# Patient Record
Sex: MALE | Race: WHITE | Employment: FULL TIME | URBAN - NONMETROPOLITAN AREA
[De-identification: names, ages, dates, MRNs, and addresses within clinical notes are randomized per-mention and may not be internally consistent; named-entity substitution may affect disease eponyms.]

---

## 2017-10-06 ENCOUNTER — TELEPHONE (OUTPATIENT)
Dept: PULMONOLOGY | Age: 32
End: 2017-10-06

## 2017-10-06 ENCOUNTER — INITIAL CONSULT (OUTPATIENT)
Dept: PULMONOLOGY | Age: 32
End: 2017-10-06
Payer: COMMERCIAL

## 2017-10-06 VITALS
OXYGEN SATURATION: 98 % | SYSTOLIC BLOOD PRESSURE: 129 MMHG | HEIGHT: 71 IN | DIASTOLIC BLOOD PRESSURE: 86 MMHG | WEIGHT: 288.2 LBS | HEART RATE: 64 BPM | BODY MASS INDEX: 40.35 KG/M2

## 2017-10-06 DIAGNOSIS — R06.83 SNORING: Primary | ICD-10-CM

## 2017-10-06 DIAGNOSIS — G47.30 SLEEP APNEA, UNSPECIFIED: ICD-10-CM

## 2017-10-06 PROCEDURE — 99203 OFFICE O/P NEW LOW 30 MIN: CPT | Performed by: INTERNAL MEDICINE

## 2017-10-06 NOTE — PROGRESS NOTES
Sleep Medicine Initial Consultation    Monica Roger                                             Chief Complaint: Karlie Macedo is here as a new patient referred by [id number] [facility] with no prior studies    Monica Roger is a 27 y. o.oldmale came for further evaluation regarding his ?sleep apnea  with referral from Ms Brartr. 72. He usually goes to bed at 9:30 to 10:00 PM and wakes up at  6:00 AM. Over the weekends his sleep schedule remain same. He denies taking naps. He usually falls a sleep in <10 minutes. He denies watching Television in his bed room. He denies reading books in his bed room. He denies working with his electronic devices in his bed room before going to sleep. He denies any difficulty in going to sleep. He wakes up 1 to 2 times during night. Majority of nocturnal awakenings are not for urination but to take care of his daughter. He denies any difficulty in falling back to sleep after nocturnal awkenings. He was noticed to have loud snoring without withnessed apneas by his family members including his wife during sleep. He  denies history of choking and gasping sensation at night time. He denies headaches in the morning. He admits to dry mouth in the morning occasionally. He denies palpitations during night time or during nocturnal awakenings. He admits to sweating during nocturnal awakenings. He denies history of head injury in the past. He denies motor vehicle accidents. He denies any history suggestive of hypnagogic or hypnopompic hallucinations. He denies any history of seizures and sleep walking. He denies any history of sleep talking. No history suggestive of bruxism. No history suggestive of restless leg syndrome. He denies any history suggestive of cataplexy or sleep paralysis. No history suggestive of periodic limb movements during sleep    No family history of obstructive sleep apnea. No family history of Narcolepsy.  He never had sleep study in the past.      Social History:  Social History   Substance Use Topics    Smoking status: Former Smoker     Packs/day: 0.50     Types: Cigarettes     Start date: 10/6/2002     Quit date: 10/6/2007    Smokeless tobacco: None    Alcohol use No   .  He is currently working as at [employer] during the daytime. He drinks 1 to 3 cups of coffee per day. He drinks 1 to 2 cans of caffeinated beverages i.e sodas per day I.e Coke or Pepsi. He drinks 1 glass of tea per week. He drinks alcoholic beverages socially. No history of recreational drug use. Past Medical History:   Diagnosis Date    Heart burn        Past Surgical History:   Procedure Laterality Date    ANTERIOR CRUCIATE LIGAMENT REPAIR         No Known Allergies    Current Outpatient Prescriptions   Medication Sig Dispense Refill    pantoprazole (PROTONIX) 20 MG tablet Take 20 mg by mouth daily      Multiple Vitamins-Minerals (THERAPEUTIC MULTIVITAMIN-MINERALS) tablet Take 1 tablet by mouth daily      ondansetron (ZOFRAN ODT) 4 MG disintegrating tablet Take 1 tablet by mouth every 8 hours as needed for Nausea 20 tablet 0     No current facility-administered medications for this visit. No family history on file. Review of Systems   General/Constitutional: No recent loss of weight or appetite changes. No fever or chills. HENT: Negative. Eyes: Negative. Upper respiratory tract: No nasal stuffiness or post nasal drip. Lower respiratory tract/ lungs: No cough or sputum production. No hemoptysis. Cardiovascular: No palpitations or chest pain. Gastrointestinal: No nausea or vomiting. Neurological: No focal neurologiacal weakness. Extremities: No edema. Musculoskeletal: No complaints. Genitourinary: No complaints. Hematological: Negative. Psychiatric/Behavioral: Negative. Skin: No itching.     /86 (Site: Left Arm, Position: Sitting, Cuff Size: Large Adult)  Pulse 64  Ht 5' 11\" (1.803 m)  Wt 288 lb 3.2 oz (130.7 kg)  SpO2 98% Comment: RA at rest  BMI 40.2 kg/m2  Mallampati airway Class: 4  Neck Circumference:16.75 Inches   [place] sleepiness score 10/6/17: 1      Physical Exam   Nursing note and vitals reviewed. Constitutional: Patient appears well built and well nourished. No distress. Patient is oriented to person, place, and time. HENT:   Head: Normocephalic and atraumatic. Right Ear: External ear normal.   Left Ear: External ear normal.   Mouth/Throat: Oropharynx is clear and moist.  No oral thrush. Eyes: Conjunctivae are normal. Pupils are equal, round, and reactive to light. No scleral icterus. Neck: Neck supple. No JVD present. No tracheal deviation present. Cardiovascular: Normal rate, regular rhythm, normal heart sounds. No murmur heard. Pulmonary/Chest: Effort normal and breath sounds normal. No stridor. No respiratory distress. No wheezes. No rales. Patient exhibits no tenderness. Abdominal: Soft. Patient exhibits no distension. No tenderness. Musculoskeletal: Normal range of motion. Extremities: Patient exhibits no edema and no tenderness. Lymphadenopathy:  No cervical adenopathy. Neurological: Patient is alert and oriented to person, place, and time. Skin: Skin is warm and dry. Patient is not diaphoretic. Psychiatric: Patient  has a normal mood and affect. Patient behavior is normal.     Diagnostic Data:  None related sleep. Assessment:  -Snoring without witnessed apneas, nocturnal awakenings- need to evaluate for obstructive sleep apnea. His snoring is troubling his wife's sleep.  -Inadequate sleep hygiene. -GERD on treatment with PPI. He follows with his family physician. Recommendations/Plan:  -Will schedule patient for polysomnogram in the sleep lab to evaluate and to exclude sleep apnea.    -I had a discussion with patient regarding avialable treatment options for his sleep disorder breathing including but not limited to CPAP titration in the sleep lab Vs.Dental appliance placement

## 2017-10-06 NOTE — PATIENT INSTRUCTIONS
Recommendations/Plan:  -Will schedule patient for polysomnogram in the sleep lab to evaluate and to exclude sleep apnea. -I had a discussion with patient regarding avialable treatment options for his sleep disorder breathing including but not limited to CPAP titration in the sleep lab Vs.Dental appliance placement with referral to a local dentist Vs other available surgical options including Uvulopalatopharyngoplasty, maxillomandibular ostomy and tracheostomy as last option. At the end of discussion, he is not decided on his treatment if he found to have obstructive sleep apnea at this time.  -We will see Rogelio Keith back in 1week after the sleep study to go over the sleep study results and further management options.  -He was educated to practice good sleep hygiene practices. He was provided with a good sleep hygiene hand out. Jacqueline Ruiz was advised to make earlier appointment with my clinic if he develops any worsening of sleep symptoms. He verbalizes understanding.  -He was advised to loose weight by controlling diet and doing exercise once cleared by his family physician. - Rogelio Keith was educated about my impression and plan. He verbalizes understanding.

## 2017-10-06 NOTE — MR AVS SNAPSHOT
After Visit Summary             [facility]   10/6/2017 10:30 AM   Initial consult    Description:  Male : 1985   Provider:  Helen Berry MD   Department:  [facility] and Future Appointments         Below is a list of your follow-up and future appointments. This may not be a complete list as you may have made appointments directly with providers that we are not aware of or your providers may have made some for you. Please call your providers to confirm appointments. It is important to keep your appointments. Please bring your current insurance card, photo ID, co-pay, and all medication bottles to your appointment. If self-pay, payment is expected at the time of service. Information from Your Visit        Department     Name Address Phone [facility] Pulmonary Medicine [address]  [facility] [place] [employer] [phone]      Vital Signs     Blood Pressure Pulse Height Weight Oxygen Saturation Body Mass Index    129/86 (Site: Left Arm, Position: Sitting, Cuff Size: Large Adult) 64 5' 11\" (1.803 m) 288 lb 3.2 oz (130.7 kg) 98% 40.2 kg/m2    Smoking Status                   Former Smoker           Additional Information about your Body Mass Index (BMI)           Your BMI as listed above is considered obese (30 or more). BMI is an estimate of body fat, calculated from your height and weight. The higher your BMI, the greater your risk of heart disease, high blood pressure, type 2 diabetes, stroke, gallstones, arthritis, sleep apnea, and certain cancers. BMI is not perfect. It may overestimate body fat in athletes and people who are more muscular. Even a small weight loss (between 5 and 10 percent of your current weight) by decreasing your calorie intake and becoming more physically active will help lower your risk of developing or worsening diseases associated with obesity.

## 2018-03-16 ENCOUNTER — OFFICE VISIT (OUTPATIENT)
Dept: PULMONOLOGY | Age: 33
End: 2018-03-16
Payer: COMMERCIAL

## 2018-03-16 VITALS
HEART RATE: 102 BPM | BODY MASS INDEX: 39.48 KG/M2 | HEIGHT: 71 IN | OXYGEN SATURATION: 97 % | SYSTOLIC BLOOD PRESSURE: 116 MMHG | DIASTOLIC BLOOD PRESSURE: 74 MMHG | WEIGHT: 282 LBS

## 2018-03-16 DIAGNOSIS — G47.30 SLEEP APNEA, UNSPECIFIED TYPE: Primary | ICD-10-CM

## 2018-03-16 DIAGNOSIS — R06.83 SNORING: ICD-10-CM

## 2018-03-16 DIAGNOSIS — K21.9 GASTROESOPHAGEAL REFLUX DISEASE WITHOUT ESOPHAGITIS: ICD-10-CM

## 2018-03-16 PROCEDURE — 99213 OFFICE O/P EST LOW 20 MIN: CPT | Performed by: INTERNAL MEDICINE

## 2018-03-16 RX ORDER — OMEPRAZOLE 10 MG/1
10 CAPSULE, DELAYED RELEASE ORAL DAILY
COMMUNITY

## 2018-03-16 NOTE — PATIENT INSTRUCTIONS
Health Maintenance Due   Topic Date Due    HIV screen  01/13/1998    DTaP/Tdap/Td vaccine (1 - Tdap) 01/13/2002    Flu vaccine (1) 09/01/2017     Recommendations/Plan:  -Will re schedule patient for polysomnogram in the sleep lab to evaluate and to exclude sleep apnea. .  -We will see Ana Rosa Huntley back in 1week after the sleep study to go over the sleep study results and further management options.  -He was educated to practice good sleep hygiene practices. He was provided with a good sleep hygiene hand out. Genevieve Gray was advised to make earlier appointment with my clinic if he develops any worsening of sleep symptoms. He verbalizes understanding.  -He was advised to loose weight by controlling diet and doing exercise once cleared by his family physician. - Ana Rosa Huntley was educated about my impression and plan. He verbalizes understanding.

## 2018-03-16 NOTE — PROGRESS NOTES
Sleep Medicine clinic follow up note                                         [facility]                                             Chief Complaint: Ino Rousseau is here for follow up and discuss regarding having a sleep test.    He was initially seen by me on 10/6/17. He was requested to have polysomnogram in the sleep lab to evaluate and to exclude sleep apnea. How ever he never had sleep test so far. He called our office in October, 2017 and informed that he is not going to have the sleep test done in 2017  due to its cost and high deductible. He came for follow up to reschedule his sleep test.     His wife is still complaining about his loud snoring during sleep. He was initially referred  from Ms Mauricio Wong. He usually goes to bed at 9:30 to 10:00 PM and wakes up at  6:00 AM. Over the weekends his sleep schedule remain same. He denies taking naps. He usually falls a sleep in <10 minutes. He denies watching Television in his bed room. He denies reading books in his bed room. He denies working with his electronic devices in his bed room before going to sleep. He denies any difficulty in going to sleep. He wakes up 0 to 1 times during night to take care of his 11months old daughter. He denies any difficulty in falling back to sleep after nocturnal awkenings. He was noticed to have loud snoring without withnessed apneas by his family members including his wife during sleep. He  denies history of choking and gasping sensation at night time. He denies headaches in the morning. He admits to dry mouth in the morning occasionally. He denies palpitations during night time or during nocturnal awakenings. He admits to sweating during nocturnal awakenings. He denies history of head injury in the past. He denies motor vehicle accidents. He denies any history suggestive of hypnagogic or hypnopompic hallucinations.  He denies any history of polysomnogram in the sleep lab to evaluate and to exclude sleep apnea. .  -We will see Seng Chen back in 1week after the sleep study to go over the sleep study results and further management options.  -He was educated to practice good sleep hygiene practices. He was provided with a good sleep hygiene hand out. Jakob Washburn was advised to make earlier appointment with my clinic if he develops any worsening of sleep symptoms. He verbalizes understanding.  -He was advised to loose weight by controlling diet and doing exercise once cleared by his family physician. - Seng Chen was educated about my impression and plan. He verbalizes understanding.

## 2018-03-25 ENCOUNTER — HOSPITAL ENCOUNTER (OUTPATIENT)
Dept: SLEEP CENTER | Age: 33
Discharge: HOME | End: 2018-03-27
Payer: COMMERCIAL

## 2018-03-25 DIAGNOSIS — R06.83 SNORING: ICD-10-CM

## 2018-03-25 DIAGNOSIS — K21.9 GASTROESOPHAGEAL REFLUX DISEASE WITHOUT ESOPHAGITIS: ICD-10-CM

## 2018-03-25 DIAGNOSIS — G47.30 SLEEP APNEA, UNSPECIFIED TYPE: ICD-10-CM

## 2018-03-25 PROCEDURE — 95810 POLYSOM 6/> YRS 4/> PARAM: CPT

## 2018-03-26 LAB — STATUS: NORMAL

## 2018-03-27 NOTE — PROGRESS NOTES
[address] [place] [address]                                SLEEP STUDY REPORT    PATIENT NAME: Mary Alvarez                      :        1985  MED REC NO:   [id number]                           ROOM:  ACCOUNT NO:   [de-identified]                           ADMIT DATE: 2018  PROVIDER:     Vineet Ramirez. MD Verónica    DATE OF STUDY:  2018    REFERRING PROVIDER:  Kandace Smith CNP. The patient's height is 71 inches, weight is 282 pounds with a BMI of 39.3. HISTORY:  The patient is a 20-year-old gentleman who was initially  evaluated by me recently on 2018. The patient scheduled for a sleep  study to further evaluate for sleep apnea. The patient had associated  comorbidities including gastroesophageal reflux disease. The patient  denies hypersomnia. METHODS:  The patient underwent digital polysomnography in compliance with  the standards and specifications from the AASM Manual including the  simultaneous recording of 3 EEG channels (F4-M1, C4-M1, and O2-M1 with back  up electrodes F3-M2, C3-M2, and O1-M2), 2 EOG channels (E1-M2, and E2-M1,),  EMG (chin, left & right leg), EKG, Nonin pulse oximetry with  less than 2  second averaging time, body position, airflow recorded by oral-nasal  thermal sensor and nasal air pressure transducer, plus respiratory effort  recorded by calibrated respiratory inductance plethysmography (RIP), flow  volume loop, sound and video. Sleep staging and scoring followed the  standard put forth by the American Academy of Sleep Medicine and utilized  the 4A obstructive hypopnea event desaturation of 4 percent or greater. INTERPRETATION:  This is a baseline sleep study and the study was performed  on 2018.   The study was started at 09:52 p.m. and was terminated at  05:25 a.m. with a total recording time of 453.5 minutes and the sleep  period time was 438.6 minutes, total sleep time was 323 minutes and overall  sleep efficiency was 71.2%. The sleep onset latency was 14.9 minutes, wake  after sleep onset was 115.6 minutes, and REM sleep latency was 103 minutes. SLEEP STAGING AND DISTRIBUTION SUMMARY:  Revealed the patient spent 19  minutes in stage I consisting of 5.9%, 274 minutes in stage II consisting  of 84.8%, 2 minutes in the stage III consisting of 0.6%, and 28 minutes in  REM sleep consisting of 8.7% of the total sleep time. RESPIRATORY EVENT ANALYSIS:  Revealed the patient had a total of 307  apneas. All of them are obstructive in nature. The patient also had a  total of 142 obstructive hypopneas. The total number of apneas and  hypopneas recorded during the study were 451 with an apnea-hypopnea index  of 83.8. The patient's REM sleep apnea-hypopnea index was 68.6. POSITION ANALYSIS:  Revealed the patient spent 196.5 minutes in supine  position and 126.5 minutes in right lateral position with a supine  apnea-hypopnea index was 98 whereas right lateral position apnea-hypopnea  index was 62. 6. PERIODIC LIMB MOVEMENT ANALYSIS:  Revealed the patient had a total of 35  periodic limb movements. Out of 35, One is associated with arousals with a  PLM index of 6.5. PLM arousal index is 0.2. The patient had a total of 9  spontaneous arousals with a spontaneous arousal index of 1.7. OXYGEN SATURATION MONITORING:  Revealed the patient had a maximum oxygen  desaturation to 78% with a mean oxygen saturation of 90.5%. The patient  spent a total of 86.3 minutes below oxygen saturation less than 88%. EKG MONITORING:  Revealed normal sinus rhythm. The patient was found to have a soft-to-moderate snoring during the sleep  study. IMPRESSION:  1. Severe obstructive sleep apnea with worsening of respiratory events  during supine position. 2.  Decreased amount of REM sleep. 3.  Periodic limb movements with no significant arousals. 4.  Nocturnal hypoxia. The patient spent a total of 86.3 minutes below  oxygen saturation less than 88%. 5.  Gastroesophageal reflux disease, currently on treatment with proton  pump inhibitor. RECOMMENDATIONS:  1. For the patient's sleep disordered breathing, due to severity of sleep  apnea, the patient needs treatment. 2.  If the patient choose to go for a CPAP titration as a treatment, the  patient should be scheduled for a CPAP titration and follow up with my  clinic in six to eight weeks. I recommended CPAP therapy for clinical  reevaluation with review of download. 3.  If the patient wished to discuss her sleep study report, the patient  should be scheduled for follow up with my clinic as soon as possible. Thanks to Janny Isaac CNP. for giving me this opportunity to  participate in the care of this pleasant gentleman.     Jacqueline Avery MD    D: 03/26/2018 19:32:55       T: 03/26/2018 19:55:23     [email or web address]  Job#: [id number]     Doc#: [id number]    CC:

## 2018-03-30 ENCOUNTER — TELEPHONE (OUTPATIENT)
Dept: SLEEP CENTER | Age: 33
End: 2018-03-30

## 2018-04-03 ENCOUNTER — TELEPHONE (OUTPATIENT)
Dept: SLEEP CENTER | Age: 33
End: 2018-04-03

## 2018-04-04 ENCOUNTER — OFFICE VISIT (OUTPATIENT)
Dept: PULMONOLOGY | Age: 33
End: 2018-04-04
Payer: COMMERCIAL

## 2018-04-04 VITALS
WEIGHT: 283 LBS | OXYGEN SATURATION: 97 % | TEMPERATURE: 98.2 F | HEIGHT: 71 IN | HEART RATE: 74 BPM | BODY MASS INDEX: 39.62 KG/M2 | SYSTOLIC BLOOD PRESSURE: 128 MMHG | DIASTOLIC BLOOD PRESSURE: 84 MMHG

## 2018-04-04 DIAGNOSIS — G47.33 OSA (OBSTRUCTIVE SLEEP APNEA): Primary | ICD-10-CM

## 2018-04-04 PROCEDURE — 99213 OFFICE O/P EST LOW 20 MIN: CPT | Performed by: PHYSICIAN ASSISTANT

## 2018-04-12 ENCOUNTER — HOSPITAL ENCOUNTER (OUTPATIENT)
Dept: SLEEP CENTER | Age: 33
Discharge: HOME | End: 2018-04-14
Payer: COMMERCIAL

## 2018-04-12 DIAGNOSIS — G47.33 OSA (OBSTRUCTIVE SLEEP APNEA): ICD-10-CM

## 2018-04-12 PROCEDURE — 95811 POLYSOM 6/>YRS CPAP 4/> PARM: CPT

## 2018-04-13 DIAGNOSIS — G47.33 OSA ON CPAP: Primary | ICD-10-CM

## 2018-04-13 DIAGNOSIS — Z99.89 OSA ON CPAP: Primary | ICD-10-CM

## 2018-04-13 LAB — STATUS: NORMAL

## 2018-04-17 ENCOUNTER — TELEPHONE (OUTPATIENT)
Dept: PULMONOLOGY | Age: 33
End: 2018-04-17

## 2018-04-25 ENCOUNTER — TELEPHONE (OUTPATIENT)
Dept: SLEEP CENTER | Age: 33
End: 2018-04-25

## 2018-04-26 ENCOUNTER — TELEPHONE (OUTPATIENT)
Dept: SLEEP CENTER | Age: 33
End: 2018-04-26

## 2018-06-14 ENCOUNTER — OFFICE VISIT (OUTPATIENT)
Dept: PULMONOLOGY | Age: 33
End: 2018-06-14
Payer: COMMERCIAL

## 2018-06-14 VITALS
HEART RATE: 107 BPM | OXYGEN SATURATION: 97 % | BODY MASS INDEX: 38.64 KG/M2 | DIASTOLIC BLOOD PRESSURE: 84 MMHG | HEIGHT: 71 IN | WEIGHT: 276 LBS | SYSTOLIC BLOOD PRESSURE: 126 MMHG

## 2018-06-14 DIAGNOSIS — G47.33 OSA (OBSTRUCTIVE SLEEP APNEA): Primary | ICD-10-CM

## 2018-06-14 PROCEDURE — 99213 OFFICE O/P EST LOW 20 MIN: CPT | Performed by: PHYSICIAN ASSISTANT

## 2018-06-14 ASSESSMENT — ENCOUNTER SYMPTOMS
GASTROINTESTINAL NEGATIVE: 1
ORTHOPNEA: 0
RESPIRATORY NEGATIVE: 1
WHEEZING: 0
HEARTBURN: 0
SINUS PAIN: 0
SHORTNESS OF BREATH: 0
EYES NEGATIVE: 1
NAUSEA: 0
SORE THROAT: 0
COUGH: 0
SPUTUM PRODUCTION: 0

## 2018-12-17 ENCOUNTER — OFFICE VISIT (OUTPATIENT)
Dept: PULMONOLOGY | Age: 33
End: 2018-12-17
Payer: COMMERCIAL

## 2018-12-17 VITALS
HEIGHT: 71 IN | HEART RATE: 68 BPM | WEIGHT: 283.6 LBS | OXYGEN SATURATION: 97 % | SYSTOLIC BLOOD PRESSURE: 138 MMHG | BODY MASS INDEX: 39.7 KG/M2 | DIASTOLIC BLOOD PRESSURE: 78 MMHG

## 2018-12-17 DIAGNOSIS — E66.9 OBESITY (BMI 30-39.9): ICD-10-CM

## 2018-12-17 DIAGNOSIS — G47.33 OSA (OBSTRUCTIVE SLEEP APNEA): Primary | ICD-10-CM

## 2018-12-17 PROCEDURE — 99214 OFFICE O/P EST MOD 30 MIN: CPT | Performed by: PHYSICIAN ASSISTANT

## 2018-12-17 ASSESSMENT — ENCOUNTER SYMPTOMS
SHORTNESS OF BREATH: 0
BACK PAIN: 0
NAUSEA: 0
CHEST TIGHTNESS: 0
WHEEZING: 0
STRIDOR: 0
COUGH: 0
ALLERGIC/IMMUNOLOGIC NEGATIVE: 1
EYES NEGATIVE: 1
DIARRHEA: 0

## 2018-12-17 NOTE — PROGRESS NOTES
[facility]      Pietro Pinon         [id number]  12/17/2018   Chief Complaint   Patient presents with    Sleep Apnea     6 month [employer]  download  11/5/18-12/4/18        Pt of Dr. Rosa Forbes    PAP Download:   Original or initialAHI: 83.8     Date of initial study: 3/25/18     [] Compliant  43.3%   [x]  Noncompliant 56.7 %     PAP Type cpap Level  9   Avg Hrs/Day [id number]  AHI: 3.9   Recorded compliance dates , 11/5/8  to 12/4/18   Machine/Mfg: Respironics Interface: full    Provider:  [][facility]  []Maria Esther  [x][employer]  [][facility]         [][facility] []Other:   Neck Size: 16. .75  Mallampati 4   ESS: 0     Here is a scan of the most recent download:          Presentation:   Zeenat Correia presents for sleep medicine follow up for obstructive sleep apnea  Since the last visit, Zeenat Correia is wearing every night but it comes off through the night. He gets uncomfortable and takes it off. He does not notice a ny improvement. He does not feel tired. His wife is happy he is snoring. Equipment issues: The pressure is  acceptable, the mask is acceptable and he  is  using the humidity. Sleep issues:  Do you feel better? No  More rested? No   Better concentration? no    Progress History:   Since last visit any new medical issues? No  New ER or hospitlal visits? No  Any new or changes in medicines? No  Any new sleep medicines?  No        Past Medical History:   Diagnosis Date    Heart burn     Sleep apnea        Past Surgical History:   Procedure Laterality Date    ANTERIOR CRUCIATE LIGAMENT REPAIR         Social History   Substance Use Topics    Smoking status: Former Smoker     Packs/day: 0.50     Types: Cigarettes     Start date: 10/6/2002     Quit date: 10/6/2007    Smokeless tobacco: Never Used    Alcohol use No       No Known Allergies    Current Outpatient Prescriptions   Medication Sig Dispense Refill    omeprazole (PRILOSEC) 10 MG delayed release capsule Take 10 mg by

## 2019-01-15 ENCOUNTER — HOSPITAL ENCOUNTER (OUTPATIENT)
Age: 34
Setting detail: SPECIMEN
Discharge: HOME | End: 2019-01-15
Payer: COMMERCIAL

## 2019-01-15 LAB
ABSOLUTE EOS #: 0.12 K/UL (ref 0–0.44)
ABSOLUTE IMMATURE GRANULOCYTE: 0.03 K/UL (ref 0–0.3)
ABSOLUTE LYMPH #: 2.49 K/UL (ref 1.1–3.7)
ABSOLUTE MONO #: 0.76 K/UL (ref 0.1–1.2)
ALBUMIN SERPL-MCNC: 4.8 G/DL (ref 3.5–5.2)
ALBUMIN/GLOBULIN RATIO: 1.7 (ref 1–2.5)
ALP BLD-CCNC: 71 U/L (ref 40–129)
ALT SERPL-CCNC: 33 U/L (ref 5–41)
ANION GAP SERPL CALCULATED.3IONS-SCNC: 16 MMOL/L (ref 9–17)
AST SERPL-CCNC: 24 U/L
BASOPHILS # BLD: 1 % (ref 0–2)
BASOPHILS ABSOLUTE: 0.05 K/UL (ref 0–0.2)
BILIRUB SERPL-MCNC: 0.4 MG/DL (ref 0.3–1.2)
BUN BLDV-MCNC: 11 MG/DL (ref 6–20)
BUN/CREAT BLD: ABNORMAL (ref 9–20)
CALCIUM SERPL-MCNC: 10 MG/DL (ref 8.6–10.4)
CHLORIDE BLD-SCNC: 96 MMOL/L (ref 98–107)
CHOLESTEROL, FASTING: 211 MG/DL
CHOLESTEROL/HDL RATIO: 4.6
CO2: 28 MMOL/L (ref 20–31)
CREAT SERPL-MCNC: 1.09 MG/DL (ref 0.7–1.2)
DIFFERENTIAL TYPE: ABNORMAL
EOSINOPHILS RELATIVE PERCENT: 2 % (ref 1–4)
GFR AFRICAN AMERICAN: >60 ML/MIN
GFR NON-AFRICAN AMERICAN: >60 ML/MIN
GFR SERPL CREATININE-BSD FRML MDRD: ABNORMAL ML/MIN/{1.73_M2}
GFR SERPL CREATININE-BSD FRML MDRD: ABNORMAL ML/MIN/{1.73_M2}
GLUCOSE FASTING: 80 MG/DL (ref 70–99)
HCT VFR BLD CALC: 53.5 % (ref 40.7–50.3)
HDLC SERPL-MCNC: 46 MG/DL
HEMOGLOBIN: 16.9 G/DL (ref 13–17)
IMMATURE GRANULOCYTES: 0 %
LDL CHOLESTEROL: 134 MG/DL (ref 0–130)
LYMPHOCYTES # BLD: 37 % (ref 24–43)
MCH RBC QN AUTO: 29.4 PG (ref 25.2–33.5)
MCHC RBC AUTO-ENTMCNC: 31.6 G/DL (ref 28.4–34.8)
MCV RBC AUTO: 93.2 FL (ref 82.6–102.9)
MONOCYTES # BLD: 11 % (ref 3–12)
NRBC AUTOMATED: 0 PER 100 WBC
PDW BLD-RTO: 12.6 % (ref 11.8–14.4)
PLATELET # BLD: 268 K/UL (ref 138–453)
PLATELET ESTIMATE: ABNORMAL
PMV BLD AUTO: 10.3 FL (ref 8.1–13.5)
POTASSIUM SERPL-SCNC: 4.7 MMOL/L (ref 3.7–5.3)
RBC # BLD: 5.74 M/UL (ref 4.21–5.77)
RBC # BLD: ABNORMAL 10*6/UL
SEG NEUTROPHILS: 49 % (ref 36–65)
SEGMENTED NEUTROPHILS ABSOLUTE COUNT: 3.24 K/UL (ref 1.5–8.1)
SODIUM BLD-SCNC: 140 MMOL/L (ref 135–144)
TOTAL PROTEIN: 7.7 G/DL (ref 6.4–8.3)
TRIGLYCERIDE, FASTING: 154 MG/DL
TSH SERPL DL<=0.05 MIU/L-ACNC: 2.78 MIU/L (ref 0.3–5)
VLDLC SERPL CALC-MCNC: ABNORMAL MG/DL (ref 1–30)
WBC # BLD: 6.7 K/UL (ref 3.5–11.3)
WBC # BLD: ABNORMAL 10*3/UL

## 2019-06-17 ENCOUNTER — OFFICE VISIT (OUTPATIENT)
Dept: PULMONOLOGY | Age: 34
End: 2019-06-17
Payer: COMMERCIAL

## 2019-06-17 VITALS
OXYGEN SATURATION: 97 % | RESPIRATION RATE: 18 BRPM | HEIGHT: 71 IN | DIASTOLIC BLOOD PRESSURE: 80 MMHG | BODY MASS INDEX: 40.18 KG/M2 | WEIGHT: 287 LBS | SYSTOLIC BLOOD PRESSURE: 130 MMHG | HEART RATE: 76 BPM

## 2019-06-17 DIAGNOSIS — G47.33 OSA (OBSTRUCTIVE SLEEP APNEA): Primary | ICD-10-CM

## 2019-06-17 DIAGNOSIS — E66.9 OBESITY (BMI 30-39.9): ICD-10-CM

## 2019-06-17 PROCEDURE — 99213 OFFICE O/P EST LOW 20 MIN: CPT | Performed by: PHYSICIAN ASSISTANT

## 2019-06-17 ASSESSMENT — ENCOUNTER SYMPTOMS
ALLERGIC/IMMUNOLOGIC NEGATIVE: 1
NAUSEA: 0
COUGH: 0
SHORTNESS OF BREATH: 0
BACK PAIN: 0
WHEEZING: 0
STRIDOR: 0
EYES NEGATIVE: 1
CHEST TIGHTNESS: 0
DIARRHEA: 0

## 2019-06-17 NOTE — PROGRESS NOTES
[facility]      Dalton Michel         [id number]  2019   Chief Complaint   Patient presents with    Sleep Apnea     [facility] 6 mo f/u with download        Pt of Dr. Miladys Raphael    PAP Download:   Original or initialAHI: 83.8     Date of initial study: 3-25-18     Compliant  76.7%     Noncompliant 23.5 %     PAP Type C PAP Level  9.0 cmH2O  Avg Hrs/Day 4 hrs 54 min 40 sec  AHI: 7.3   Recorded compliance dates , 19  to 6-10-19  Machine/Mfg: Respironics Interface: FFM    Provider:    __[facility]           Marjorie Stains        _X_ [employer]    __ Diannah Buerger            __[facility] __[employer]   __[place]:       __Other    Neck Size: 16.75 in    Mallampati IV  ESS:  2  SAQLI: 95    Here is a scan of the most recent download:                  Presentation:   Stacey Hayes presents for sleep medicine follow up for obstructive sleep apnea  Since the last visit, Stacey Hayes is doing well with PAP. He feels rested. He does not notice any difference with or with out PAP. Equipment issues: The pressure is  acceptable, the mask is acceptable and he  is  using the humidity. Sleep issues:  Do you feel better? Yes  More rested? Yes   Better concentration? yes    Progress History:   Since last visit any new medical issues? No  New ER or hospitlal visits? No  Any new or changes in medicines? No  Any new sleep medicines?  No        Past Medical History:   Diagnosis Date    Heart burn     Sleep apnea        Past Surgical History:   Procedure Laterality Date    ANTERIOR CRUCIATE LIGAMENT REPAIR         Social History     Tobacco Use    Smoking status: Former Smoker     Packs/day: 0.50     Types: Cigarettes     Start date: 10/6/2002     Last attempt to quit: 10/6/2007     Years since quittin.7    Smokeless tobacco: Never Used   Substance Use Topics    Alcohol use: No    Drug use: No       No Known Allergies    Current Outpatient Medications   Medication Sig Dispense Refill    omeprazole (PRILOSEC) 10 MG delayed release capsule Take 10 mg by mouth daily      Multiple Vitamins-Minerals (THERAPEUTIC MULTIVITAMIN-MINERALS) tablet Take 1 tablet by mouth daily       No current facility-administered medications for this visit. History reviewed. No pertinent family history. Review of Systems -   Review of Systems   Constitutional: Negative for activity change, appetite change, chills and fever. HENT: Negative for congestion and postnasal drip. Eyes: Negative. Respiratory: Negative for cough, chest tightness, shortness of breath, wheezing and stridor. Cardiovascular: Negative for chest pain and leg swelling. Gastrointestinal: Negative for diarrhea and nausea. Endocrine: Negative. Genitourinary: Negative. Musculoskeletal: Negative. Negative for arthralgias and back pain. Skin: Negative. Allergic/Immunologic: Negative. Neurological: Negative. Negative for dizziness and light-headedness. Psychiatric/Behavioral: Negative. All other systems reviewed and are negative. Physical Exam:    BMI:  Body mass index is 40.03 kg/m². Wt Readings from Last 3 Encounters:   06/17/19 287 lb (130.2 kg)   12/17/18 283 lb 9.6 oz (128.6 kg)   06/14/18 276 lb (125.2 kg)     Weight gained 11 lbs over 1 year  Vitals: /80 (Site: Left Lower Arm, Position: Sitting, Cuff Size: Medium Adult)   Pulse 76   Resp 18   Ht 5' 11\" (1.803 m)   Wt 287 lb (130.2 kg)   SpO2 97% Comment: on RA  BMI 40.03 kg/m²       Physical Exam   Constitutional: He is oriented to person, place, and time. He appears well-developed and well-nourished. HENT:   Head: Normocephalic and atraumatic. Right Ear: External ear normal.   Left Ear: External ear normal.   Mouth/Throat: Oropharynx is clear and moist.   Eyes: Pupils are equal, round, and reactive to light. Conjunctivae and EOM are normal.   Neck: Normal range of motion. Neck supple. Cardiovascular: Normal rate, regular rhythm and normal heart sounds.

## 2020-01-30 ENCOUNTER — HOSPITAL ENCOUNTER (OUTPATIENT)
Age: 35
Setting detail: SPECIMEN
Discharge: HOME | End: 2020-01-30
Payer: COMMERCIAL

## 2020-01-30 LAB
ABSOLUTE EOS #: 0.2 K/UL (ref 0–0.44)
ABSOLUTE IMMATURE GRANULOCYTE: 0.04 K/UL (ref 0–0.3)
ABSOLUTE LYMPH #: 2.5 K/UL (ref 1.1–3.7)
ABSOLUTE MONO #: 0.55 K/UL (ref 0.1–1.2)
ALBUMIN SERPL-MCNC: 4.9 G/DL (ref 3.5–5.2)
ALBUMIN/GLOBULIN RATIO: 1.8 (ref 1–2.5)
ALP BLD-CCNC: 81 U/L (ref 40–129)
ALT SERPL-CCNC: 31 U/L (ref 5–41)
ANION GAP SERPL CALCULATED.3IONS-SCNC: 17 MMOL/L (ref 9–17)
AST SERPL-CCNC: 20 U/L
BASOPHILS # BLD: 1 % (ref 0–2)
BASOPHILS ABSOLUTE: 0.05 K/UL (ref 0–0.2)
BILIRUB SERPL-MCNC: 0.42 MG/DL (ref 0.3–1.2)
BUN BLDV-MCNC: 14 MG/DL (ref 6–20)
BUN/CREAT BLD: NORMAL (ref 9–20)
CALCIUM SERPL-MCNC: 9.7 MG/DL (ref 8.6–10.4)
CHLORIDE BLD-SCNC: 99 MMOL/L (ref 98–107)
CHOLESTEROL/HDL RATIO: 4.7
CHOLESTEROL: 207 MG/DL
CO2: 25 MMOL/L (ref 20–31)
CREAT SERPL-MCNC: 0.97 MG/DL (ref 0.7–1.2)
DIFFERENTIAL TYPE: ABNORMAL
EOSINOPHILS RELATIVE PERCENT: 3 % (ref 1–4)
GFR AFRICAN AMERICAN: >60 ML/MIN
GFR NON-AFRICAN AMERICAN: >60 ML/MIN
GFR SERPL CREATININE-BSD FRML MDRD: NORMAL ML/MIN/{1.73_M2}
GFR SERPL CREATININE-BSD FRML MDRD: NORMAL ML/MIN/{1.73_M2}
GLUCOSE BLD-MCNC: 88 MG/DL (ref 70–99)
HCT VFR BLD CALC: 52.4 % (ref 40.7–50.3)
HDLC SERPL-MCNC: 44 MG/DL
HEMOGLOBIN: 16.3 G/DL (ref 13–17)
IMMATURE GRANULOCYTES: 1 %
LDL CHOLESTEROL: 142 MG/DL (ref 0–130)
LYMPHOCYTES # BLD: 39 % (ref 24–43)
MCH RBC QN AUTO: 29.6 PG (ref 25.2–33.5)
MCHC RBC AUTO-ENTMCNC: 31.1 G/DL (ref 28.4–34.8)
MCV RBC AUTO: 95.1 FL (ref 82.6–102.9)
MONOCYTES # BLD: 9 % (ref 3–12)
NRBC AUTOMATED: 0 PER 100 WBC
PDW BLD-RTO: 12.9 % (ref 11.8–14.4)
PLATELET # BLD: 289 K/UL (ref 138–453)
PLATELET ESTIMATE: ABNORMAL
PMV BLD AUTO: 10.4 FL (ref 8.1–13.5)
POTASSIUM SERPL-SCNC: 4.2 MMOL/L (ref 3.7–5.3)
RBC # BLD: 5.51 M/UL (ref 4.21–5.77)
RBC # BLD: ABNORMAL 10*6/UL
SEG NEUTROPHILS: 47 % (ref 36–65)
SEGMENTED NEUTROPHILS ABSOLUTE COUNT: 3.05 K/UL (ref 1.5–8.1)
SODIUM BLD-SCNC: 141 MMOL/L (ref 135–144)
TOTAL PROTEIN: 7.6 G/DL (ref 6.4–8.3)
TRIGL SERPL-MCNC: 103 MG/DL
VLDLC SERPL CALC-MCNC: ABNORMAL MG/DL (ref 1–30)
WBC # BLD: 6.4 K/UL (ref 3.5–11.3)
WBC # BLD: ABNORMAL 10*3/UL

## 2020-08-04 ENCOUNTER — OFFICE VISIT (OUTPATIENT)
Dept: PULMONOLOGY | Age: 35
End: 2020-08-04
Payer: COMMERCIAL

## 2020-08-04 VITALS
TEMPERATURE: 97.8 F | WEIGHT: 307.2 LBS | SYSTOLIC BLOOD PRESSURE: 118 MMHG | HEIGHT: 71 IN | HEART RATE: 72 BPM | OXYGEN SATURATION: 98 % | DIASTOLIC BLOOD PRESSURE: 76 MMHG | BODY MASS INDEX: 43.01 KG/M2

## 2020-08-04 PROCEDURE — 99213 OFFICE O/P EST LOW 20 MIN: CPT | Performed by: PHYSICIAN ASSISTANT

## 2020-08-04 ASSESSMENT — ENCOUNTER SYMPTOMS
WHEEZING: 0
SHORTNESS OF BREATH: 0
CHEST TIGHTNESS: 0
BACK PAIN: 0
EYES NEGATIVE: 1
COUGH: 0
DIARRHEA: 0
STRIDOR: 0
ALLERGIC/IMMUNOLOGIC NEGATIVE: 1
NAUSEA: 0

## 2020-08-04 NOTE — PROGRESS NOTES
[facility]      Shahana Medrano         [id number]  2020   Chief Complaint   Patient presents with    Follow-up     NIXON 1 year sleep follow up with a [employer] pap download        Pt of Dr. Cali Melton    PAP Download:   Original or initial AHI: 83.8     Date of initial study: 2018      Compliant  96.7%     Noncompliant 3.3 %     PAP Type Cpap Level  11   Avg Hrs/Day 6 hours 11 minutes 22 seconds  AHI: 2.6   Recorded compliance dates , 2020  to 2020   Machine/Mfg:   [x] ResMed    [] Respironics/Dreamstation   Interface:   [] Nasal    [] Nasal pillows   [x] FFM      Provider:      [] [facility]     []Maria Esther     [x] [employer]    [] [address]    [] [place]               [] [facility]      [] Adaptive    [] [address]      [] Other    Neck Size: 16.75  Mallampati Mallampati 4  ESS:  2    Here is a scan of the most recent download:        Presentation:   Shon Martines presents for sleep medicine follow up for obstructive sleep apnea  Since the last visit, Shon Martines is doing well with PAP. He does not notice any difference whether wearing PAP or not. Equipment issues: The pressure is  acceptable, the mask is acceptable     Sleep issues:  Do you feel better? Yes  More rested? Yes   Better concentration? yes    Progress History:   Since last visit any new medical issues? No  New ER or hospitlal visits? No  Any new or changes in medicines? No  Any new sleep medicines?  No        Past Medical History:   Diagnosis Date    Heart burn     Sleep apnea        Past Surgical History:   Procedure Laterality Date    ANTERIOR CRUCIATE LIGAMENT REPAIR         Social History     Tobacco Use    Smoking status: Former Smoker     Packs/day: 0.50     Types: Cigarettes     Start date: 10/6/2002     Last attempt to quit: 10/6/2007     Years since quittin.8    Smokeless tobacco: Never Used   Substance Use Topics    Alcohol use: No    Drug use: No       No Known Allergies    Current Outpatient Medications   Medication Sig Dispense Refill    CPAP Machine MISC by Does not apply route Please change CPAP pressure to 11 cm H20. 1 each 0    omeprazole (PRILOSEC) 10 MG delayed release capsule Take 10 mg by mouth daily      Multiple Vitamins-Minerals (THERAPEUTIC MULTIVITAMIN-MINERALS) tablet Take 1 tablet by mouth daily       No current facility-administered medications for this visit. No family history on file. Review of Systems -   Review of Systems   Constitutional: Negative for activity change, appetite change, chills and fever. HENT: Negative for congestion and postnasal drip. Eyes: Negative. Respiratory: Negative for cough, chest tightness, shortness of breath, wheezing and stridor. Cardiovascular: Negative for chest pain and leg swelling. Gastrointestinal: Negative for diarrhea and nausea. Endocrine: Negative. Genitourinary: Negative. Musculoskeletal: Negative. Negative for arthralgias and back pain. Skin: Negative. Allergic/Immunologic: Negative. Neurological: Negative. Negative for dizziness and light-headedness. Psychiatric/Behavioral: Negative. All other systems reviewed and are negative. Physical Exam:    BMI:  Body mass index is 42.85 kg/m². Wt Readings from Last 3 Encounters:   08/04/20 (!) 307 lb 3.2 oz (139.3 kg)   06/17/19 287 lb (130.2 kg)   12/17/18 283 lb 9.6 oz (128.6 kg)     Weight gained 20 lbs over 1 year  Vitals: /76 (Site: Right Upper Arm, Position: Sitting, Cuff Size: Large Adult)   Pulse 72   Temp 97.8 °F (36.6 °C)   Ht 5' 11\" (1.803 m)   Wt (!) 307 lb 3.2 oz (139.3 kg)   SpO2 98% Comment: on room air at rest  BMI 42.85 kg/m²       Physical Exam  Constitutional:       Appearance: He is well-developed. HENT:      Head: Normocephalic and atraumatic.       Right Ear: External ear normal.      Left Ear: External ear normal.   Eyes:      Conjunctiva/sclera: Conjunctivae normal.      Pupils: Pupils are equal, round, and reactive to light. Neck:      Musculoskeletal: Normal range of motion and neck supple. Cardiovascular:      Rate and Rhythm: Normal rate and regular rhythm. Heart sounds: Normal heart sounds. Pulmonary:      Effort: Pulmonary effort is normal.      Breath sounds: Normal breath sounds. Musculoskeletal: Normal range of motion. Skin:     General: Skin is warm and dry. Neurological:      Mental Status: He is alert and oriented to person, place, and time. Psychiatric:         Behavior: Behavior normal.         Thought Content: Thought content normal.         Judgment: Judgment normal.           ASSESSMENT/DIAGNOSIS     Diagnosis Orders   1. NIXON on CPAP     2. Morbid obesity with BMI of 40.0-44.9, adult [facility])              Plan   Do you need any equipment today? Yes     - He  was advised to continue current positive airway pressure therapy with above described pressure. - He  advised to keep good compliance with current recommended pressure to get optimal results and clinical improvement  - Recommend 7-9 hours of sleep with PAP  - He was advised to call [employer] regarding supplies if needed.   -He call my office for earlier appointment if needed for worsening of sleep symptoms.   - He was instructed on weight loss  - Dasrhan was educated about my impression and plan. Patient verbalizesunderstanding.   We will see Idris Vallejo back in: 1 year with download    Information added by my medical assistant/LPN was reviewed today         Mark Ferguson PA-C, MPAS  8/4/2020

## 2021-06-06 ENCOUNTER — HOSPITAL ENCOUNTER (EMERGENCY)
Age: 36
Discharge: HOME | End: 2021-06-06
Attending: EMERGENCY MEDICINE
Payer: COMMERCIAL

## 2021-06-06 VITALS
HEART RATE: 85 BPM | HEIGHT: 71 IN | DIASTOLIC BLOOD PRESSURE: 71 MMHG | RESPIRATION RATE: 16 BRPM | SYSTOLIC BLOOD PRESSURE: 139 MMHG | WEIGHT: 300 LBS | OXYGEN SATURATION: 97 % | BODY MASS INDEX: 42 KG/M2 | TEMPERATURE: 97.3 F

## 2021-06-06 DIAGNOSIS — K14.3 COATED TONGUE: ICD-10-CM

## 2021-06-06 DIAGNOSIS — K14.0 GLOSSITIS: Primary | ICD-10-CM

## 2021-06-06 PROCEDURE — 99213 OFFICE O/P EST LOW 20 MIN: CPT

## 2021-06-06 PROCEDURE — 99203 OFFICE O/P NEW LOW 30 MIN: CPT | Performed by: EMERGENCY MEDICINE

## 2021-06-06 ASSESSMENT — ENCOUNTER SYMPTOMS
NAUSEA: 0
VOMITING: 0
SHORTNESS OF BREATH: 0
DIARRHEA: 0
EYE REDNESS: 0
EYE PAIN: 0
TROUBLE SWALLOWING: 0
STRIDOR: 0
EYE DISCHARGE: 0
ABDOMINAL PAIN: 0
SINUS PRESSURE: 0
COUGH: 0
VOICE CHANGE: 0
SORE THROAT: 1
BACK PAIN: 0
WHEEZING: 0

## 2021-06-06 NOTE — ED PROVIDER NOTES
[phone]       Chief Complaint   Patient presents with    Pharyngitis    Nasal Congestion       Nurses Notes reviewed and I agree except as noted in the HPI. HISTORY OF PRESENT ILLNESS   Daniela Roman is a 43 y.o. male who presents with 3-day history of coated tongue that feels slightly tender with congestion and slight sore throat. He denies pain at this time. Patient took antibiotics 3 weeks prior. No inhaled corticosteroids, history of diabetes or oral candidiasis. No loss of taste or smell, painful or swollen glands, fever, vomiting, stridor or hoarseness. Quit smoking    REVIEW OF SYSTEMS     Review of Systems   Constitutional: Negative for appetite change, chills, fatigue, fever and unexpected weight change. HENT: Positive for congestion, postnasal drip and sore throat. Negative for ear discharge, ear pain, sinus pressure, sneezing, trouble swallowing and voice change. White coated tongue with slight tenderness   Eyes: Negative for pain, discharge and redness. Respiratory: Negative for cough, shortness of breath, wheezing and stridor. Cardiovascular: Negative for chest pain and leg swelling. Gastrointestinal: Negative for abdominal pain, diarrhea, nausea and vomiting. Genitourinary: Negative for dysuria, frequency, hematuria and urgency. Musculoskeletal: Negative for arthralgias, back pain, myalgias and neck pain. Skin: Negative for rash. Neurological: Negative for dizziness, syncope, weakness and headaches. Hematological: Negative for adenopathy. Psychiatric/Behavioral: Negative for behavioral problems, confusion, sleep disturbance and suicidal ideas. The patient is not nervous/anxious. All other systems reviewed and are negative.       PAST MEDICAL HISTORY         Diagnosis Date    Heart burn     Sleep apnea        SURGICAL HISTORY     Patient  has a past surgical history that includes Anterior cruciate ligament repair. CURRENT MEDICATIONS       Discharge Medication List as of 6/6/2021  4:14 PM      CONTINUE these medications which have NOT CHANGED    Details   Pseudoephedrine-APAP-DM (DAYQUIL PO) Take 2 capsules by mouthHistorical Med      CPAP Machine MISC Starting Mon 6/17/2019, Disp-1 each, R-0, PrintPlease change CPAP pressure to 11 cm H20.      omeprazole (PRILOSEC) 10 MG delayed release capsule Take 10 mg by mouth dailyHistorical Med      Multiple Vitamins-Minerals (THERAPEUTIC MULTIVITAMIN-MINERALS) tablet Take 1 tablet by mouth daily             ALLERGIES     Patient is has No Known Allergies. FAMILY HISTORY     Patient'sfamily history is not on file. SOCIAL HISTORY     Patient  reports that he quit smoking about 13 years ago. His smoking use included cigarettes. He started smoking about 18 years ago. He smoked 0.50 packs per day. He has never used smokeless tobacco. He reports that he does not drink alcohol and does not use drugs. PHYSICAL EXAM     ED TRIAGE VITALS  BP: 139/71, Temp: 97.3 °F (36.3 °C), Pulse: 85, Resp: 16, SpO2: 97 %  Physical Exam  Vitals and nursing note reviewed. Constitutional:       General: He is not in acute distress. Appearance: He is well-developed. He is not ill-appearing. Comments: Moist membranes, normal airway   HENT:      Head: Normocephalic and atraumatic. Right Ear: Tympanic membrane and external ear normal.      Left Ear: Tympanic membrane and external ear normal.      Nose: Nose normal. No congestion. Right Sinus: No maxillary sinus tenderness or frontal sinus tenderness. Left Sinus: No maxillary sinus tenderness or frontal sinus tenderness. Mouth/Throat:      Pharynx: No oropharyngeal exudate. Comments: Coated tongue slightly tender no evidence of obvious oral candidiasis  Eyes:      General: No scleral icterus. Right eye: No discharge. Left eye: No discharge.       Extraocular Movements:      Right eye: Normal extraocular motion. Left eye: Normal extraocular motion. Conjunctiva/sclera: Conjunctivae normal.      Pupils: Pupils are equal, round, and reactive to light. Comments: Conjunctiva clear   Neck:      Thyroid: No thyromegaly. Vascular: No JVD. Comments: No meningismus  Cardiovascular:      Rate and Rhythm: Normal rate and regular rhythm. Pulses: Normal pulses. Heart sounds: Normal heart sounds, S1 normal and S2 normal. No murmur heard. No friction rub. No gallop. Pulmonary:      Effort: Pulmonary effort is normal. No tachypnea or respiratory distress. Breath sounds: Normal breath sounds. No stridor. No decreased breath sounds, wheezing, rhonchi or rales. Comments: No cough, lungs clear  Chest:      Chest wall: No tenderness. Abdominal:      General: Bowel sounds are normal. There is no distension. Palpations: Abdomen is soft. There is no mass. Tenderness: There is no abdominal tenderness. There is no guarding or rebound. Musculoskeletal:         General: No tenderness. Normal range of motion. Cervical back: Normal range of motion. Right lower leg: Normal.      Left lower leg: Normal.   Lymphadenopathy:      Cervical: Cervical adenopathy present. Right cervical: Superficial cervical adenopathy present. No deep cervical adenopathy. Left cervical: Superficial cervical adenopathy present. No deep cervical adenopathy. Skin:     General: Skin is warm and dry. Findings: No erythema or rash. Comments: No rash or bruising on examined areas   Neurological:      Mental Status: He is alert and oriented to person, place, and time. Cranial Nerves: No cranial nerve deficit. Motor: No abnormal muscle tone. Coordination: Coordination normal.      Deep Tendon Reflexes: Reflexes are normal and symmetric.  Reflexes normal.      Comments: Appropriate, no focal finding   Psychiatric:         Behavior: Behavior normal. Thought Content: Thought content normal.         Judgment: Judgment normal.         DIAGNOSTIC RESULTS   Labs: No results found for this visit on 06/06/21. IMAGING:  No orders to display     URGENT CARE COURSE:     Vitals:    06/06/21 1522   BP: 139/71   Pulse: 85   Resp: 16   Temp: 97.3 °F (36.3 °C)   SpO2: 97%   Weight: 300 lb (136.1 kg)   Height: 5' 11\" (1.803 m)       Medications - No data to display  PROCEDURES:  None  FINALIMPRESSION      1. Glossitis    2. Coated tongue        DISPOSITION/PLAN   DISPOSITION Decision To Discharge 06/06/2021 04:00:15 PM  Nontoxic, well-hydrated, normal airway. No airway abscess or epiglottitis, sepsis, CNS infection, pneumonia, hypoxia, bronchospasm. Patient has tender tongue with slight coating. Not sure of etiology. Did have recent antibiotics orally. Will treat with nystatin, Tylenol, Motrin.   Patient to follow-up with PCP in 9 days if problems persist, and he understands to go to ED if worse  PATIENT REFERRED TO:  DEWEY Kay - NP  [place] [phone]    Schedule an appointment as soon as possible for a visit in 9 days  Recheck if problems persist, go to emergency if worse    DISCHARGE MEDICATIONS:  Discharge Medication List as of 6/6/2021  4:14 PM      START taking these medications    Details   nystatin (MYCOSTATIN) 557671 UNIT/ML suspension Take 4 mLs by mouth 4 times daily for 10 days Retain in mouth as long as possible, Oral, 4 TIMES DAILY Starting Sun 6/6/2021, Until Wed 6/16/2021, For 10 days, Disp-160 mL, R-0, Print           Discharge Medication List as of 6/6/2021  4:14 PM          MD Mikayla Rubi MD  06/06/21 0607

## 2021-08-04 ENCOUNTER — OFFICE VISIT (OUTPATIENT)
Dept: PULMONOLOGY | Age: 36
End: 2021-08-04
Payer: COMMERCIAL

## 2021-08-04 VITALS
OXYGEN SATURATION: 97 % | HEART RATE: 60 BPM | TEMPERATURE: 97.8 F | SYSTOLIC BLOOD PRESSURE: 132 MMHG | WEIGHT: 315 LBS | BODY MASS INDEX: 44.1 KG/M2 | DIASTOLIC BLOOD PRESSURE: 82 MMHG | HEIGHT: 71 IN

## 2021-08-04 DIAGNOSIS — Z99.89 OSA ON CPAP: Primary | ICD-10-CM

## 2021-08-04 DIAGNOSIS — G47.33 OSA ON CPAP: Primary | ICD-10-CM

## 2021-08-04 DIAGNOSIS — E66.01 MORBID OBESITY WITH BMI OF 40.0-44.9, ADULT (HCC): ICD-10-CM

## 2021-08-04 PROCEDURE — 99213 OFFICE O/P EST LOW 20 MIN: CPT | Performed by: PHYSICIAN ASSISTANT

## 2021-08-04 ASSESSMENT — ENCOUNTER SYMPTOMS
BACK PAIN: 0
ALLERGIC/IMMUNOLOGIC NEGATIVE: 1
EYES NEGATIVE: 1
WHEEZING: 0
SHORTNESS OF BREATH: 0
STRIDOR: 0
CHEST TIGHTNESS: 0
COUGH: 0
NAUSEA: 0
DIARRHEA: 0

## 2022-06-22 NOTE — PROGRESS NOTES
[facility], [facility] Care and [facility] Medicine      Morenita Elder         [id number]  8/4/2021   Chief Complaint   Patient presents with    Follow-up     NIXON 1 year with download         Pt of Dr. Bing Robert    PAP Download:   Teresita Vasquez or initial AHI: 83.8     Date of initial study: 3/25/2018      Compliant  96.7%     Noncompliant n/a %     PAP Type CPAP Level  11   Avg Hrs/Day 6 hr 15 min   AHI: 1.3   Recorded compliance dates , 7/4/2021  to 8/2/2021   Machine/Mfg:   [] ResMed    [x] Respironics/Dreamstation   Interface:   [] Nasal    [] Nasal pillows   [x] FFM      Provider:      [] [facility]     [][employer]     [x] [employer]    [] Ahmed Tyler    [] [place]               [] [facility] [facility]      [] [employer]    [] [address]      [] Other    Neck Size: 16.75  Mallampati Mallampati 4  ESS: 0   SAQLI: 97    Here is a scan of the most recent download:          Presentation:   Funmilayo Rivers presents for sleep medicine follow up for obstructive sleep apnea  Since the last visit, Funmilayo Rivers is doing well with PAP. He is sleeping well and feels rested. Equipment issues: The pressure is  acceptable, the mask is acceptable     Sleep issues:  Do you feel better? Yes  More rested? Yes   Better concentration? yes    Progress History:   Since last visit any new medical issues? No  New ER or hospital visits? No  Any new or changes in medicines? No  Any new sleep medicines? No    Review of Systems -   Review of Systems   Constitutional: Negative for activity change, appetite change, chills and fever. HENT: Negative for congestion and postnasal drip. Eyes: Negative. Respiratory: Negative for cough, chest tightness, shortness of breath, wheezing and stridor. Cardiovascular: Negative for chest pain and leg swelling. Gastrointestinal: Negative for diarrhea and nausea. Endocrine: Negative. Genitourinary: Negative. Musculoskeletal: Negative. Negative for arthralgias and back pain. Skin: Negative.     Allergic/Immunologic: Negative. Neurological: Negative. Negative for dizziness and light-headedness. Psychiatric/Behavioral: Negative. All other systems reviewed and are negative. Physical Exam:    BMI:  Body mass index is 44.69 kg/m². Wt Readings from Last 3 Encounters:   08/04/21 (!) 320 lb 6.4 oz (145.3 kg)   06/06/21 300 lb (136.1 kg)   08/04/20 (!) 307 lb 3.2 oz (139.3 kg)     Weight stable / unchanged  Vitals: /82 (Site: Right Upper Arm, Position: Sitting, Cuff Size: Large Adult)   Pulse 60   Temp 97.8 °F (36.6 °C) (Temporal)   Ht 5' 11\" (1.803 m)   Wt (!) 320 lb 6.4 oz (145.3 kg)   SpO2 97% Comment: rm air at rest  BMI 44.69 kg/m²       Physical Exam  Constitutional:       Appearance: He is well-developed. HENT:      Head: Normocephalic and atraumatic. Right Ear: External ear normal.      Left Ear: External ear normal.   Eyes:      Conjunctiva/sclera: Conjunctivae normal.      Pupils: Pupils are equal, round, and reactive to light. Cardiovascular:      Rate and Rhythm: Normal rate and regular rhythm. Heart sounds: Normal heart sounds. Pulmonary:      Effort: Pulmonary effort is normal.      Breath sounds: Normal breath sounds. Musculoskeletal:         General: Normal range of motion. Cervical back: Normal range of motion and neck supple. Skin:     General: Skin is warm and dry. Neurological:      Mental Status: He is alert and oriented to person, place, and time. Psychiatric:         Behavior: Behavior normal.         Thought Content: Thought content normal.         Judgment: Judgment normal.           ASSESSMENT/DIAGNOSIS     Diagnosis Orders   1. NIXON on CPAP     2. Morbid obesity with BMI of 40.0-44.9, adult [facility])            Plan   Do you need any equipment today? Yes update supplies  - Recall discussed with patient and the risk and benefits of continuing to use PAP were discussed. Instructed to call DME for further instructions.   - Download reviewed and discussed with patient  - He  was advised to continue current positive airway pressure therapy with above described pressure. - He  advised to keep good compliance with current recommended pressure to get optimal results and clinical improvement  - Recommend 7-9 hours of sleep with PAP  - He was advised to call [employer] regarding supplies if needed.   -He call my office for earlier appointment if needed for worsening of sleep symptoms.   - He was instructed on weight loss  - Hugh Eleonora was educated about my impression and plan. Patient verbalizesunderstanding.   We will see Amber Erickson back in: 1 year with download    Information added by my medical assistant/LPN was reviewed today         Radha Boyle PA-C, MPAS  8/4/2021 normal (ped)...

## 2022-08-08 ENCOUNTER — OFFICE VISIT (OUTPATIENT)
Dept: PULMONOLOGY | Age: 37
End: 2022-08-08
Payer: COMMERCIAL

## 2022-08-08 VITALS
SYSTOLIC BLOOD PRESSURE: 126 MMHG | OXYGEN SATURATION: 92 % | BODY MASS INDEX: 43.96 KG/M2 | HEIGHT: 71 IN | TEMPERATURE: 98.2 F | WEIGHT: 314 LBS | HEART RATE: 72 BPM | DIASTOLIC BLOOD PRESSURE: 82 MMHG

## 2022-08-08 DIAGNOSIS — E66.01 MORBID OBESITY WITH BMI OF 40.0-44.9, ADULT (HCC): ICD-10-CM

## 2022-08-08 DIAGNOSIS — Z99.89 OSA ON CPAP: Primary | ICD-10-CM

## 2022-08-08 DIAGNOSIS — G47.33 OSA ON CPAP: Primary | ICD-10-CM

## 2022-08-08 PROCEDURE — 99213 OFFICE O/P EST LOW 20 MIN: CPT | Performed by: PHYSICIAN ASSISTANT

## 2022-08-08 ASSESSMENT — ENCOUNTER SYMPTOMS
RESPIRATORY NEGATIVE: 1
SORE THROAT: 0
WHEEZING: 0
GASTROINTESTINAL NEGATIVE: 1
BACK PAIN: 0
EYES NEGATIVE: 1
COUGH: 0
SHORTNESS OF BREATH: 0
NAUSEA: 0
VOMITING: 0

## 2022-08-08 NOTE — PROGRESS NOTES
[place] [facility] Pul[facility], Critical Care and Sleep Medicine      Laquita Hahn         [id number]  8/8/2022   Chief Complaint   Patient presents with    Follow-up     1 year oumou with download        Pt of Dr. Gustavo FROST Download:   Hyacinth Nunes or initial AHI: 83.8     Date of initial study: 3/25/18      Compliant  100%     Noncompliant 0 %     PAP Type CPAP Level  11   Avg Hrs/Day [id number]  AHI: 1.4   Recorded compliance dates : 7/6/22-8/4/22  Machine/Mfg:   [] ResMed    [x] Respironics/Dreamstation   Interface:   [] Nasal    [] Nasal pillows   [x] FFM      Provider:      [] [facility]     []Maria Esther     [x] Storm[facility]    [] [facility]    [] [place]               [] [facility] [facility]      [] [employer]    [] [address]      [] Other    Neck Size: 16.75  Mallampati 4  ESS:  1  SAQLI: 97    Here is a scan of the most recent download:        Presentation:   Kassie Rodriguez presents for sleep medicine follow up for obstructive sleep apnea  Since the last visit, Kassie Rodriguez is doing well with PAP. He is sleeping well and feels rested. Equipment issues: The pressure is  acceptable, the mask is acceptable     Sleep issues:  Do you feel better? Yes  More rested? Yes   Better concentration? yes    Progress History:   Since last visit any new medical issues? No  New ER or hospital visits? No  Any new or changes in medicines? No  Any new sleep medicines? No    Review of Systems -   Review of Systems   Constitutional: Negative. Negative for chills and fever. HENT: Negative. Negative for congestion and sore throat. Eyes: Negative. Respiratory: Negative. Negative for cough, shortness of breath and wheezing. Cardiovascular: Negative. Negative for chest pain and leg swelling. Gastrointestinal: Negative. Negative for nausea and vomiting. Genitourinary: Negative. Musculoskeletal: Negative. Negative for back pain and myalgias. Skin: Negative. Neurological: Negative. Negative for dizziness, tremors, weakness and headaches. Psychiatric/Behavioral: Negative. All other systems reviewed and are negative. Physical Exam:    BMI:  Body mass index is 43.79 kg/m². Wt Readings from Last 3 Encounters:   08/08/22 (!) 314 lb (142.4 kg)   08/04/21 (!) 320 lb 6.4 oz (145.3 kg)   06/06/21 300 lb (136.1 kg)     Weight stable / unchanged  Vitals: /82 (Site: Right Lower Arm, Position: Sitting, Cuff Size: Medium Adult)   Pulse 72   Temp 98.2 °F (36.8 °C) (Skin)   Ht 5' 11\" (1.803 m)   Wt (!) 314 lb (142.4 kg)   SpO2 92%   BMI 43.79 kg/m²       Physical Exam  Constitutional:       Appearance: Normal appearance. He is normal weight. HENT:      Head: Normocephalic and atraumatic. Right Ear: External ear normal.      Left Ear: External ear normal.      Nose: Nose normal.   Eyes:      Extraocular Movements: Extraocular movements intact. Conjunctiva/sclera: Conjunctivae normal.      Pupils: Pupils are equal, round, and reactive to light. Pulmonary:      Effort: Pulmonary effort is normal.   Musculoskeletal:      Cervical back: Normal range of motion and neck supple. Neurological:      General: No focal deficit present. Mental Status: He is alert and oriented to person, place, and time. Psychiatric:         Attention and Perception: Attention and perception normal.         Mood and Affect: Mood and affect normal.         Speech: Speech normal.         Behavior: Behavior normal. Behavior is cooperative. Thought Content: Thought content normal.         Cognition and Memory: Cognition normal.         Judgment: Judgment normal.         ASSESSMENT/DIAGNOSIS     Diagnosis Orders   1. NIXON on CPAP        2. Morbid obesity with BMI of 40.0-44.9, adult [facility])                 Plan   Do you need any equipment today? Yes update supplies  - still waiting on recall  - Download reviewed and discussed with patient  - He  was advised to continue current positive airway pressure therapy with above described pressure.    - He advised to keep good compliance with current recommended pressure to get optimal results and clinical improvement  - Recommend 7-9 hours of sleep with PAP  - He was advised to call DME company regarding supplies if needed.   -He call my office for earlier appointment if needed for worsening of sleep symptoms.   - He was instructed on weight loss  - Irma Gaspar was educated about my impression and plan. Patient verbalizesunderstanding.   We will see Xenia Alex back in: 1 year with download    Information added by my medical assistant/LPN was reviewed today         Kaelyn Arteaga PA-C, MPAS  8/8/2022

## 2022-12-15 ENCOUNTER — TELEPHONE (OUTPATIENT)
Dept: PULMONOLOGY | Age: 37
End: 2022-12-15

## 2022-12-15 NOTE — TELEPHONE ENCOUNTER
Patient LVM on office machine letting us know he needs a new supply order for his new gaitan pap. Please advise.

## 2023-05-15 ENCOUNTER — HOSPITAL ENCOUNTER (OUTPATIENT)
Age: 38
Setting detail: SPECIMEN
Discharge: HOME | End: 2023-05-15

## 2023-05-15 LAB
BASOPHILS # BLD: 0.05 K/UL (ref 0–0.2)
BASOPHILS # BLD: 1 % (ref 0–2)
EOSINOPHIL # BLD: 0.15 K/UL (ref 0–0.44)
EOSINOPHILS RELATIVE PERCENT: 2 % (ref 1–4)
ERYTHROCYTE [DISTWIDTH] IN BLOOD BY AUTOMATED COUNT: 13.1 % (ref 11.8–14.4)
HCT VFR BLD AUTO: 48.6 % (ref 40.7–50.3)
HGB BLD-MCNC: 15.2 G/DL (ref 13–17)
IMM GRANULOCYTES # BLD AUTO: 0.03 K/UL (ref 0–0.3)
IMM GRANULOCYTES NFR BLD: 0 %
LYMPHOCYTES # BLD: 43 % (ref 24–43)
LYMPHOCYTES NFR BLD: 2.88 K/UL (ref 1.1–3.7)
MCH RBC QN AUTO: 29.7 PG (ref 25.2–33.5)
MCHC RBC AUTO-ENTMCNC: 31.3 G/DL (ref 28.4–34.8)
MCV RBC AUTO: 94.9 FL (ref 82.6–102.9)
MONOCYTES NFR BLD: 0.57 K/UL (ref 0.1–1.2)
MONOCYTES NFR BLD: 9 % (ref 3–12)
NEUTROPHILS NFR BLD: 45 % (ref 36–65)
NEUTS SEG NFR BLD: 3.04 K/UL (ref 1.5–8.1)
NRBC AUTOMATED: 0 PER 100 WBC
PLATELET # BLD AUTO: 297 K/UL (ref 138–453)
PMV BLD AUTO: 9.8 FL (ref 8.1–13.5)
RBC # BLD AUTO: 5.12 M/UL (ref 4.21–5.77)
WBC OTHER # BLD: 6.7 K/UL (ref 3.5–11.3)

## 2023-05-16 LAB
ALBUMIN SERPL-MCNC: 4.6 G/DL (ref 3.5–5.2)
ALBUMIN/GLOB SERPL: 1.5 {RATIO} (ref 1–2.5)
ALP SERPL-CCNC: 79 U/L (ref 40–129)
ALT SERPL-CCNC: 38 U/L (ref 5–41)
ANION GAP SERPL CALCULATED.3IONS-SCNC: 21 MMOL/L (ref 9–17)
AST SERPL-CCNC: 27 U/L
BILIRUB SERPL-MCNC: 0.5 MG/DL (ref 0.3–1.2)
BUN SERPL-MCNC: 12 MG/DL (ref 6–20)
CALCIUM SERPL-MCNC: 9.5 MG/DL (ref 8.6–10.4)
CHLORIDE SERPL-SCNC: 102 MMOL/L (ref 98–107)
CHOLEST SERPL-MCNC: 208 MG/DL
CHOLESTEROL/HDL RATIO: 4.6
CO2 SERPL-SCNC: 20 MMOL/L (ref 20–31)
CREAT SERPL-MCNC: 1.13 MG/DL (ref 0.7–1.2)
GFR SERPL CREATININE-BSD FRML MDRD: >60 ML/MIN/1.73M2
GLUCOSE SERPL-MCNC: 75 MG/DL (ref 70–99)
HDLC SERPL-MCNC: 45 MG/DL
LDLC SERPL CALC-MCNC: 140 MG/DL (ref 0–130)
POTASSIUM SERPL-SCNC: 4.3 MMOL/L (ref 3.7–5.3)
PROT SERPL-MCNC: 7.7 G/DL (ref 6.4–8.3)
SODIUM SERPL-SCNC: 143 MMOL/L (ref 135–144)
TRIGL SERPL-MCNC: 113 MG/DL

## 2023-05-17 ENCOUNTER — TELEPHONE (OUTPATIENT)
Dept: SURGERY | Age: 38
End: 2023-05-17

## 2023-05-17 NOTE — TELEPHONE ENCOUNTER
Left voicemail for patient to call to schedule a NP referral from [facility] for umbilical hernia w/o obstruction or gangrene w/ Dr. Karl Kraft

## 2023-05-21 PROBLEM — K42.9 UMBILICAL HERNIA WITHOUT OBSTRUCTION AND WITHOUT GANGRENE: Status: ACTIVE | Noted: 2023-05-21

## 2023-05-22 ENCOUNTER — TELEPHONE (OUTPATIENT)
Dept: SURGERY | Age: 38
End: 2023-05-22

## 2023-05-22 ENCOUNTER — OFFICE VISIT (OUTPATIENT)
Dept: SURGERY | Age: 38
End: 2023-05-22
Payer: COMMERCIAL

## 2023-05-22 VITALS
HEART RATE: 79 BPM | TEMPERATURE: 98.1 F | DIASTOLIC BLOOD PRESSURE: 78 MMHG | RESPIRATION RATE: 16 BRPM | WEIGHT: 315 LBS | BODY MASS INDEX: 44.1 KG/M2 | OXYGEN SATURATION: 95 % | HEIGHT: 71 IN | SYSTOLIC BLOOD PRESSURE: 124 MMHG

## 2023-05-22 DIAGNOSIS — K42.9 UMBILICAL HERNIA WITHOUT OBSTRUCTION AND WITHOUT GANGRENE: ICD-10-CM

## 2023-05-22 DIAGNOSIS — G47.33 OSA (OBSTRUCTIVE SLEEP APNEA): ICD-10-CM

## 2023-05-22 DIAGNOSIS — K21.9 GASTROESOPHAGEAL REFLUX DISEASE WITHOUT ESOPHAGITIS: Primary | ICD-10-CM

## 2023-05-22 PROCEDURE — 99204 OFFICE O/P NEW MOD 45 MIN: CPT | Performed by: SURGERY

## 2023-05-22 ASSESSMENT — ENCOUNTER SYMPTOMS
FACIAL SWELLING: 0
WHEEZING: 0
CHEST TIGHTNESS: 0
BLOOD IN STOOL: 0
BACK PAIN: 0
ABDOMINAL PAIN: 0
EYE PAIN: 0
DIARRHEA: 0
SORE THROAT: 0
EYE DISCHARGE: 0
ABDOMINAL DISTENTION: 0
CHOKING: 0
VOICE CHANGE: 0
PHOTOPHOBIA: 0
APNEA: 0
CONSTIPATION: 0
ANAL BLEEDING: 0
COUGH: 0
TROUBLE SWALLOWING: 0
VOMITING: 0
SINUS PAIN: 0
RECTAL PAIN: 0
EYE REDNESS: 0
STRIDOR: 0
NAUSEA: 0
EYE ITCHING: 0
SINUS PRESSURE: 0
COLOR CHANGE: 0
RHINORRHEA: 0
SHORTNESS OF BREATH: 0

## 2023-05-22 NOTE — TELEPHONE ENCOUNTER
Per Betty    No Authorization Required  Service Type  2 - Surgical    Place of Service  25 - On [facility]    Service From - To Date  2023-06-05 - 2023-06-05    Quantity  1 Units  Level of Service  Elective    Diagnosis Code 1  Z856 - Umbilical hernia without obstruction or gangrene    Procedure Code 1  08468 - RPR AA HRN 1ST < 3 NCR/STRN    Quantity  1 Units    Status  NO AUTH REQUIRED

## 2023-05-22 NOTE — TELEPHONE ENCOUNTER
[address]    Phone * [phone]   Surgical Scheduling Direct line Phone * [phone]  Fax * [phone]      Vu Torres      1985    male    [address] [facility] [address]   Marital Status:         Home Phone: [phone]   Cell Phone:   Telephone Information:   Mobile [phone]              Surgeon: Dr. Óscar Knight  Surgery Date:06-   Time: 7:30am     Procedure: Open umbilical hernia repair with mesh    Outpatient     Diagnosis: Umbilical hernia    Important Medical History: In Epic    Special Inst/Equip:     CPT Codes: 97303    Latex Allergy:   no Cardiac Device:  no    Anesthesia Type: MAC    Case Location:  Main OR     Preadmission Testing: Phone Call      PAT Date and Time: ________________________________    PAT Confirmation #: _________________________________    Post Op Visit:  ______________________________________    Need Preop Cardiac Clearance:   no    Does patient have Cardiologist/physician?  No      Surgery Conformation #:  ______________________________________________    : __________________________________ Date:____________________        [employer] Name:  Sharon Palafox

## 2023-05-22 NOTE — PROGRESS NOTES
problems, confusion, decreased concentration, dysphoric mood, hallucinations, self-injury, sleep disturbance and suicidal ideas. The patient is not nervous/anxious and is not hyperactive.     /78   Pulse 79   Temp 98.1 °F (36.7 °C)   Resp 16   Ht 5' 11\" (1.803 m)   Wt (!) 319 lb 12.8 oz (145.1 kg)   SpO2 95%   BMI 44.60 kg/m²     Objective:   Physical Exam    Assessment:            Plan:              Judy Medina LPN

## 2023-05-22 NOTE — TELEPHONE ENCOUNTER
Patient scheduled for surgery with Dr. Mik Napier on 06- at 7:30am with an arrival of 6:00am.  Preop surgery instructions and antibacterial soap given. Surgery consent signed.

## 2023-05-31 ENCOUNTER — TELEPHONE (OUTPATIENT)
Dept: SURGERY | Age: 38
End: 2023-05-31

## 2023-05-31 NOTE — TELEPHONE ENCOUNTER
Patient's surgery rescheduled to 06- at 7:30am with an arrival of 6:00am.  Patient voiced understanding.

## 2023-06-05 ENCOUNTER — ANESTHESIA EVENT (OUTPATIENT)
Dept: OPERATING ROOM | Age: 38
End: 2023-06-05
Payer: COMMERCIAL

## 2023-06-06 ENCOUNTER — HOSPITAL ENCOUNTER (OUTPATIENT)
Age: 38
Setting detail: OUTPATIENT SURGERY
Discharge: HOME | End: 2023-06-06
Attending: SURGERY | Admitting: SURGERY
Payer: COMMERCIAL

## 2023-06-06 ENCOUNTER — ANESTHESIA (OUTPATIENT)
Dept: OPERATING ROOM | Age: 38
End: 2023-06-06
Payer: COMMERCIAL

## 2023-06-06 VITALS
HEART RATE: 63 BPM | OXYGEN SATURATION: 95 % | TEMPERATURE: 96.8 F | HEIGHT: 71 IN | WEIGHT: 315 LBS | SYSTOLIC BLOOD PRESSURE: 130 MMHG | DIASTOLIC BLOOD PRESSURE: 80 MMHG | RESPIRATION RATE: 16 BRPM | BODY MASS INDEX: 44.1 KG/M2

## 2023-06-06 DIAGNOSIS — Z09 S/P UMBILICAL HERNIA REPAIR, FOLLOW-UP EXAM: Primary | ICD-10-CM

## 2023-06-06 PROCEDURE — 2580000003 HC RX 258

## 2023-06-06 PROCEDURE — 2580000003 HC RX 258: Performed by: SURGERY

## 2023-06-06 PROCEDURE — 3700000001 HC ADD 15 MINUTES (ANESTHESIA): Performed by: SURGERY

## 2023-06-06 PROCEDURE — 6370000000 HC RX 637 (ALT 250 FOR IP): Performed by: SURGERY

## 2023-06-06 PROCEDURE — 2500000003 HC RX 250 WO HCPCS: Performed by: SURGERY

## 2023-06-06 PROCEDURE — 7100000011 HC PHASE II RECOVERY - ADDTL 15 MIN: Performed by: SURGERY

## 2023-06-06 PROCEDURE — C1781 MESH (IMPLANTABLE): HCPCS | Performed by: SURGERY

## 2023-06-06 PROCEDURE — 3600000012 HC SURGERY LEVEL 2 ADDTL 15MIN: Performed by: SURGERY

## 2023-06-06 PROCEDURE — 3600000002 HC SURGERY LEVEL 2 BASE: Performed by: SURGERY

## 2023-06-06 PROCEDURE — 2709999900 HC NON-CHARGEABLE SUPPLY: Performed by: SURGERY

## 2023-06-06 PROCEDURE — 49594 RPR AA HRN 1ST 3-10 NCR/STRN: CPT | Performed by: SURGERY

## 2023-06-06 PROCEDURE — 3700000000 HC ANESTHESIA ATTENDED CARE: Performed by: SURGERY

## 2023-06-06 PROCEDURE — 6360000002 HC RX W HCPCS

## 2023-06-06 PROCEDURE — 7100000010 HC PHASE II RECOVERY - FIRST 15 MIN: Performed by: SURGERY

## 2023-06-06 DEVICE — PATCH HERN M DIA2.5IN CIR W/ STRP SEPRA TECHNOLOGY ABSRB: Type: IMPLANTABLE DEVICE | Site: UMBILICAL | Status: FUNCTIONAL

## 2023-06-06 RX ORDER — SODIUM CHLORIDE 0.9 % (FLUSH) 0.9 %
5-40 SYRINGE (ML) INJECTION PRN
Status: DISCONTINUED | OUTPATIENT
Start: 2023-06-06 | End: 2023-06-06 | Stop reason: HOSPADM

## 2023-06-06 RX ORDER — ACETAMINOPHEN 500 MG
1000 TABLET ORAL ONCE
Status: COMPLETED | OUTPATIENT
Start: 2023-06-06 | End: 2023-06-06

## 2023-06-06 RX ORDER — SODIUM CHLORIDE 9 MG/ML
INJECTION, SOLUTION INTRAVENOUS CONTINUOUS
Status: DISCONTINUED | OUTPATIENT
Start: 2023-06-06 | End: 2023-06-06 | Stop reason: HOSPADM

## 2023-06-06 RX ORDER — IBUPROFEN 800 MG/1
800 TABLET ORAL ONCE
Status: COMPLETED | OUTPATIENT
Start: 2023-06-06 | End: 2023-06-06

## 2023-06-06 RX ORDER — CALCIUM CARBONATE 500 MG/1
1 TABLET, CHEWABLE ORAL DAILY
COMMUNITY

## 2023-06-06 RX ORDER — SODIUM CHLORIDE 9 MG/ML
INJECTION, SOLUTION INTRAVENOUS CONTINUOUS PRN
Status: DISCONTINUED | OUTPATIENT
Start: 2023-06-06 | End: 2023-06-06 | Stop reason: SDUPTHER

## 2023-06-06 RX ORDER — SODIUM CHLORIDE 0.9 % (FLUSH) 0.9 %
5-40 SYRINGE (ML) INJECTION EVERY 12 HOURS SCHEDULED
Status: DISCONTINUED | OUTPATIENT
Start: 2023-06-06 | End: 2023-06-06 | Stop reason: HOSPADM

## 2023-06-06 RX ORDER — HYDROCODONE BITARTRATE AND ACETAMINOPHEN 5; 325 MG/1; MG/1
1 TABLET ORAL ONCE
Status: COMPLETED | OUTPATIENT
Start: 2023-06-06 | End: 2023-06-06

## 2023-06-06 RX ORDER — LIDOCAINE HCL/PF 100 MG/5ML
SYRINGE (ML) INJECTION PRN
Status: DISCONTINUED | OUTPATIENT
Start: 2023-06-06 | End: 2023-06-06 | Stop reason: SDUPTHER

## 2023-06-06 RX ORDER — DEXAMETHASONE SODIUM PHOSPHATE 10 MG/ML
INJECTION, EMULSION INTRAMUSCULAR; INTRAVENOUS PRN
Status: DISCONTINUED | OUTPATIENT
Start: 2023-06-06 | End: 2023-06-06 | Stop reason: SDUPTHER

## 2023-06-06 RX ORDER — SODIUM CHLORIDE 9 MG/ML
INJECTION, SOLUTION INTRAVENOUS PRN
Status: DISCONTINUED | OUTPATIENT
Start: 2023-06-06 | End: 2023-06-06 | Stop reason: HOSPADM

## 2023-06-06 RX ORDER — HYDROCODONE BITARTRATE AND ACETAMINOPHEN 5; 325 MG/1; MG/1
1 TABLET ORAL EVERY 6 HOURS PRN
Qty: 20 TABLET | Refills: 0 | Status: SHIPPED | OUTPATIENT
Start: 2023-06-06 | End: 2023-06-11

## 2023-06-06 RX ORDER — PROPOFOL 10 MG/ML
INJECTION, EMULSION INTRAVENOUS CONTINUOUS PRN
Status: DISCONTINUED | OUTPATIENT
Start: 2023-06-06 | End: 2023-06-06 | Stop reason: SDUPTHER

## 2023-06-06 RX ADMIN — PROPOFOL 60 MG: 10 INJECTION, EMULSION INTRAVENOUS at 07:36

## 2023-06-06 RX ADMIN — SODIUM CHLORIDE: 9 INJECTION, SOLUTION INTRAVENOUS at 07:36

## 2023-06-06 RX ADMIN — SODIUM CHLORIDE: 9 INJECTION, SOLUTION INTRAVENOUS at 08:02

## 2023-06-06 RX ADMIN — ACETAMINOPHEN 1000 MG: 500 TABLET ORAL at 06:32

## 2023-06-06 RX ADMIN — DEXAMETHASONE SODIUM PHOSPHATE 10 MG: 10 INJECTION, EMULSION INTRAMUSCULAR; INTRAVENOUS at 07:38

## 2023-06-06 RX ADMIN — Medication 100 MG: at 07:36

## 2023-06-06 RX ADMIN — PROPOFOL 100 MCG/KG/MIN: 10 INJECTION, EMULSION INTRAVENOUS at 07:36

## 2023-06-06 RX ADMIN — SODIUM CHLORIDE: 9 INJECTION, SOLUTION INTRAVENOUS at 06:32

## 2023-06-06 RX ADMIN — PROPOFOL 50 MG: 10 INJECTION, EMULSION INTRAVENOUS at 07:49

## 2023-06-06 RX ADMIN — IBUPROFEN 800 MG: 800 TABLET, FILM COATED ORAL at 06:32

## 2023-06-06 RX ADMIN — PROPOFOL 40 MG: 10 INJECTION, EMULSION INTRAVENOUS at 07:41

## 2023-06-06 RX ADMIN — HYDROCODONE BITARTRATE AND ACETAMINOPHEN 1 TABLET: 5; 325 TABLET ORAL at 09:15

## 2023-06-06 ASSESSMENT — PAIN DESCRIPTION - LOCATION: LOCATION: ABDOMEN

## 2023-06-06 ASSESSMENT — PAIN - FUNCTIONAL ASSESSMENT: PAIN_FUNCTIONAL_ASSESSMENT: 0-10

## 2023-06-06 ASSESSMENT — PAIN SCALES - GENERAL
PAINLEVEL_OUTOF10: 2
PAINLEVEL_OUTOF10: 2

## 2023-06-06 ASSESSMENT — PAIN DESCRIPTION - DESCRIPTORS: DESCRIPTORS: DISCOMFORT

## 2023-06-06 ASSESSMENT — PAIN DESCRIPTION - ORIENTATION: ORIENTATION: MID

## 2023-06-06 NOTE — PROGRESS NOTES
Discharge instructions given to patient and family. verbalized understanding. Patient discharged in wife's care.

## 2023-06-06 NOTE — PROGRESS NOTES
Pt admitted to [facility] room 17 and oriented to unit. SCD sleeves applied. Nares swabbed. Pt verbalized permission for first name, last initial and physicians name on white board. SDS board and discharge criteria explained, pt and family verbalized understanding. Pt denies thoughts of harming self or others. Call light in reach. Family at the bedside.  Leda Mcmahon wife [phone]

## 2023-06-06 NOTE — PROGRESS NOTES
Back to SDS from surgery Alert. wife at bedside. crackers and coffee given. Side rails up bed in low position.  Call light in reach

## 2023-06-06 NOTE — ANESTHESIA POSTPROCEDURE EVALUATION
Department of Anesthesiology  Postprocedure Note    Patient: Glenn Meade  MRN: [id number]  YOB: 1985  Date of evaluation: 6/6/2023      Procedure Summary     Date: 06/06/23 Room / Location: [address] [facility] [place]    Anesthesia Start: 0730 Anesthesia Stop: 6733    Procedure: OPEN UMBILICAL HERNIA REPAIR WITH MESH (Abdomen) Diagnosis:       Umbilical hernia without obstruction and without gangrene      (Umbilical hernia without obstruction and without gangrene [K42.9])    Surgeons: Rigoberto Greenfield MD Responsible Provider: Abundio Damian MD    Anesthesia Type: MAC ASA Status: 3          Anesthesia Type: No value filed. Abram Phase I: Abram Score: 10    Abram Phase II: Abram Score: 10      Anesthesia Post Evaluation    Patient location during evaluation: bedside  Patient participation: complete - patient cannot participate  Level of consciousness: awake and alert  Airway patency: patent  Nausea & Vomiting: no nausea and no vomiting  Complications: no  Cardiovascular status: hemodynamically stable  Respiratory status: acceptable  Hydration status: euvolemic    [facility]  POST-ANESTHESIA NOTE       Name:  Glenn Meade                                         Age:  34 y.o. MRN:  [id number]      Last Vitals:  /80   Pulse 63   Temp 96.8 °F (36 °C) (Temporal)   Resp 16   Ht 5' 11\" (1.803 m)   Wt (!) 318 lb 12.8 oz (144.6 kg)   SpO2 95%   BMI 44.46 kg/m²   No data found.     Level of Consciousness:  Awake    Respiratory:  Stable    Oxygen Saturation:  Stable    Cardiovascular:  Stable    Hydration:  Adequate    PONV:  Stable    Post-op Pain:  Adequate analgesia    Post-op Assessment:  No apparent anesthetic complications    Additional Follow-Up / Treatment / Comment:  None    Mohamud Sanders MD  June 6, 2023   4:36 PM

## 2023-06-06 NOTE — ANESTHESIA PRE PROCEDURE
Department of Anesthesiology  Preprocedure Note       Name:  Miracle Ritchie   Age:  34 y.o.  :  1985                                          MRN:  [id number]         Date:  2023      Surgeon: Percy Johnson):  Margie Herrera MD    Procedure: Procedure(s):  OPEN UMBILICAL HERNIA REPAIR WITH MESH    Medications prior to admission:   Prior to Admission medications    Medication Sig Start Date End Date Taking? Authorizing Provider   calcium carbonate (TUMS) 500 MG chewable tablet Take 1 tablet by mouth daily   Yes Historical Provider, MD   Pseudoephedrine-APAP-DM (DAYQUIL PO) Take 2 capsules by mouth    Historical Provider, MD   CPAP Machine MISC by Does not apply route Please change CPAP pressure to 11 cm H20. 19   Geraldo Matthews PA-C   omeprazole (PRILOSEC) 10 MG delayed release capsule Take 1 capsule by mouth daily    Historical Provider, MD   Multiple Vitamins-Minerals (THERAPEUTIC MULTIVITAMIN-MINERALS) tablet Take 1 tablet by mouth daily    Historical Provider, MD       Current medications:    Current Facility-Administered Medications   Medication Dose Route Frequency Provider Last Rate Last Admin    0.9 % sodium chloride infusion   IntraVENous Continuous Margie Herrera  mL/hr at 23 0632 New Bag at 23 0867    sodium chloride flush 0.9 % injection 5-40 mL  5-40 mL IntraVENous 2 times per day Margie Herrera MD        sodium chloride flush 0.9 % injection 5-40 mL  5-40 mL IntraVENous PRN Margie Herrera MD        0.9 % sodium chloride infusion   IntraVENous PRN Margie Herrera MD        ceFAZolin (ANCEF) 3000 mg in sodium chloride 0.9% 100 mL IVPB  3,000 mg IntraVENous On Call to MD Ileana           Allergies:  No Known Allergies    Problem List:    Patient Active Problem List   Diagnosis Code    NIXON (obstructive sleep apnea) G47.33    Obesity (BMI 30-39. 9) E66.9    Gastroesophageal reflux disease K21.9    BMI 40.0-44.9, adult ([facility] [address] Z68.41

## 2023-06-06 NOTE — DISCHARGE INSTRUCTIONS
urine. Pain and/or swelling in your feet, calves, or legs. Dark urine, light stools, or evidence of jaundice (yellowing of the skin or eyes). In case of an emergency, CALL 9-1-1 immediately       [facility]   []1 week   [x]2 weeks   []NONE    Call my office if you have any problem that concerns you [phone]. After hours, you can reach the answering service via the office phone number. IF YOU NEED IMMEDIATE ATTENTION, GO TO THE EMERGENCY ROOM AND YOUR DOCTOR WILL BE CONTACTED. Gina Ordonez MD M.D. [facility]  [address]  [place], [address]  Electronically signed 6/6/2023 at 8:14 AM

## 2023-06-06 NOTE — OP NOTE
fashion with chloroprep. The periumbilical area was infiltrated with  25 ccs of the mixture of equal parts of 1% lidocaine and 0.5% Marcaine with 1 cc of pediatric bicarb. A curvilinear incision was made along the inferior border of the umbilicus and  carried down to subcutaneous tissues. Subcutaneous tissue dissection with  electrocautery, elevating the umbilicus off of the underlying fascia. The  herniated contents were dissected free from surrounding tissues and could not be reduced  back within the defect so they had to be amputated just above the fascial level with cautery. Hemostasis Constilac cautery this was omentum. This point the hernia sac was dissected off at the fascial level and I was able to palpate through the defect which measured 3.2 cm a finger that nothing else was stuck underneath. The defect was large enough that repair was performed with a medium Ventralex patch 6.4 cm by 6.4 cm . It was placed through the defect and deployed, and the tab was then cut to length and secured in the fascial closure with interrupted 0 prolenbe sutures. Good approximation was appreciated. No additional pathology identified. The umbilical skin was reattached to fascia using 2-0 Vicryl suture and deep tissue approximated using 3-0 Vicryl suture and the skin closed using 4-0 Vicryl suture in a running subcuticular fashion. Skin affix glue was applied. The patient was brought out of anesthesia, transferred to [facility] in stable and condition. No immediate complication evident. All sponge, instrument and needle counts were correct at the completion of the procedure.       Phillip Phillips MD, MD FACS  Electronically signed 6/6/2023 at 8:09 AM

## 2023-06-06 NOTE — H&P
H and P for [address]  History and Physical Update    Pt Name: Gerber Martines  MRN: [id number]  YOB: 1985  Date of evaluation: 6/6/2023    [x] I have examined the patient and reviewed the H&P/Consult and there are no changes to the patient or plans.     [] I have examined the patient and reviewed the H&P/Consult and have noted the following changes:        Maile Spencer MD  Electronically signed 6/6/2023 at 6:37 AM

## 2023-06-07 ENCOUNTER — TELEPHONE (OUTPATIENT)
Dept: SURGERY | Age: 38
End: 2023-06-07

## 2023-06-08 ENCOUNTER — TELEPHONE (OUTPATIENT)
Dept: SURGERY | Age: 38
End: 2023-06-08

## 2023-06-08 NOTE — TELEPHONE ENCOUNTER
Left a message advising the patient his appointment on 06- at 8:45am needed to be changed to 12:15pm on 06- due to Dr. Denton Spencer being in surgery.

## 2023-06-19 NOTE — PROGRESS NOTES
Jenni Kearney MD [facility]  General Surgery  Postprocedure Evaluation in Office  Pt Name: Augustine Kruger  Date of Birth 1985   Today's Date: 6/20/2023  Medical Record Number: [id number]  Referring Provider: No ref. provider found  Primary Care Provider: DEWEY Stacy NP (Inactive)  Chief Complaint   Patient presents with    Follow Up After Procedure     S/p Umbilical Hernia Repair With Mesh  3.2 cm defect 6/06/23     ASSESSMENT      Problem List Items Addressed This Visit       S/P umbilical hernia repair with 6.4 cm ventralex patch - Primary        PLANS       Pathology reviewed with the patient who understands. All questions were answered. New Prescriptions    No medications on file     Patient Instructions   May return to full activity without restrictions. Follow up: Return if symptoms worsen or fail to improve. Orders Placed This Encounter:  No orders of the defined types were placed in this encounter. Mikel Quiñones is seen today for post-op follow-up. He is 2 week(s) status post open umbilical hernia repair . He is tolerating a regular diet, having regular bowel movements. Symptoms and activity have gradually improved compared to preoperative. The surgical site is clean and has no drainage. Pain is controlled without any medications. . He has compliant with postoperative instructions.   Past Medical History  Past Medical History:   Diagnosis Date    Heart burn     Sleep apnea      Past Surgical History  Past Surgical History:   Procedure Laterality Date    ANTERIOR CRUCIATE LIGAMENT REPAIR Right     UMBILICAL HERNIA REPAIR N/A 6/6/1320    OPEN UMBILICAL HERNIA REPAIR WITH MESH performed by Bernadette Garcia MD at [facility] EXTRACTION       Medications  Current Outpatient Medications on File Prior to Visit   Medication Sig Dispense Refill    calcium carbonate (TUMS) 500 MG chewable tablet Take 1 tablet by mouth daily

## 2023-06-20 ENCOUNTER — OFFICE VISIT (OUTPATIENT)
Dept: SURGERY | Age: 38
End: 2023-06-20

## 2023-06-20 VITALS
HEIGHT: 71 IN | TEMPERATURE: 97.6 F | DIASTOLIC BLOOD PRESSURE: 70 MMHG | WEIGHT: 315 LBS | OXYGEN SATURATION: 95 % | HEART RATE: 95 BPM | SYSTOLIC BLOOD PRESSURE: 116 MMHG | BODY MASS INDEX: 44.1 KG/M2 | RESPIRATION RATE: 18 BRPM

## 2023-06-20 DIAGNOSIS — Z09 S/P UMBILICAL HERNIA REPAIR, FOLLOW-UP EXAM: Primary | ICD-10-CM

## 2023-06-20 PROCEDURE — 99024 POSTOP FOLLOW-UP VISIT: CPT | Performed by: SURGERY

## 2023-07-06 PROBLEM — Z09 S/P UMBILICAL HERNIA REPAIR, FOLLOW-UP EXAM: Status: RESOLVED | Noted: 2023-06-06 | Resolved: 2023-07-06

## 2023-08-04 NOTE — PROGRESS NOTES
[place] [facility] Pulmonary, [facility] Care and Sleep Medicine      Rosa Pour         [id number]  8/7/2023   Chief Complaint   Patient presents with    Follow-up     1yr NIXON f/u w/[employer] download. Doing well. Pt states his wife has told him the new PAP machine doesn't work as well as the old one. Needs script for PAP supplies. Pt of Dr. Sammy Morgan    PAP Download:   Original or initial AHI: 83.8     Date of initial study: 3/25/2018      Compliant  96.7%     Noncompliant 3.3 %     PAP Type CPAP   Level  11 cmH2O   Avg Hrs/Day 7hrs 11mins  AHI: 10.6   Recorded compliance dates , 7/3/23  to 8/1/23   Machine/Mfg:   [] ResMed    [x] Respironics/Dreamstation   Interface:   [] Nasal    [] Nasal pillows   [x] FFM      Provider:      [] SR-HME     []Maria Esther     [x] [employer]    [] Serina Rodriges    [] Toño               [] [employer]      [] Adaptive    [] [address] [facility] [address]:      [] Other    Neck Size: 18.5 Mallampati 4  ESS:  0  SAQLI: 97    Here is a scan of the most recent download:            Presentation:   Dahlia Flowers presents for sleep medicine follow up for obstructive sleep apnea  Since the last visit, Dahlia Flowers is doing ok but wife stats he is still snoring. His AHI is elevated. His weight is up from last year. He got replacement PAP from recall. Equipment issues: The pressure is  acceptable, the mask is acceptable     Sleep issues:  Do you feel better? Yes  More rested? Yes   Better concentration? yes    Progress History:   Since last visit any new medical issues? No  New ER or hospital visits? No  Any new or changes in medicines? No  Any new sleep medicines? No    Review of Systems -   Review of Systems   Constitutional:  Negative for activity change, appetite change, chills and fever. HENT:  Negative for congestion and postnasal drip. Eyes: Negative. Respiratory:  Negative for cough, chest tightness, shortness of breath, wheezing and stridor. Cardiovascular:  Negative for chest pain and leg swelling.

## 2023-08-07 ENCOUNTER — OFFICE VISIT (OUTPATIENT)
Dept: PULMONOLOGY | Age: 38
End: 2023-08-07
Payer: COMMERCIAL

## 2023-08-07 VITALS
HEART RATE: 71 BPM | OXYGEN SATURATION: 96 % | HEIGHT: 71 IN | TEMPERATURE: 98.4 F | BODY MASS INDEX: 44.1 KG/M2 | DIASTOLIC BLOOD PRESSURE: 76 MMHG | SYSTOLIC BLOOD PRESSURE: 110 MMHG | WEIGHT: 315 LBS

## 2023-08-07 DIAGNOSIS — R06.83 SNORING: ICD-10-CM

## 2023-08-07 DIAGNOSIS — Z99.89 OSA ON CPAP: Primary | ICD-10-CM

## 2023-08-07 DIAGNOSIS — E66.01 MORBID OBESITY WITH BMI OF 45.0-49.9, ADULT (HCC): ICD-10-CM

## 2023-08-07 DIAGNOSIS — G47.33 OSA ON CPAP: Primary | ICD-10-CM

## 2023-08-07 PROCEDURE — 99214 OFFICE O/P EST MOD 30 MIN: CPT | Performed by: PHYSICIAN ASSISTANT

## 2023-08-07 RX ORDER — ATORVASTATIN CALCIUM 10 MG/1
10 TABLET, FILM COATED ORAL DAILY
COMMUNITY
Start: 2023-05-18

## 2023-08-07 ASSESSMENT — ENCOUNTER SYMPTOMS
CHEST TIGHTNESS: 0
NAUSEA: 0
BACK PAIN: 0
STRIDOR: 0
COUGH: 0
SHORTNESS OF BREATH: 0
ALLERGIC/IMMUNOLOGIC NEGATIVE: 1
DIARRHEA: 0
WHEEZING: 0
EYES NEGATIVE: 1

## 2023-11-09 NOTE — PROGRESS NOTES
[place] [facility] Pul[facility], Critical Care and Sleep Medicine      Jagdish Slaughter         [id number]  11/13/2023   Chief Complaint   Patient presents with    Follow-up     3 month oumou after pressure change        Pt of Dr. Adrian Lemus     PAP Download:   Cecy Valdez or initial AHI: 83.8     Date of initial study: 3/25/2018        Compliant  73.3%     Noncompliant 10 %     PAP Type auto cpap Level  12/20   Avg Hrs/Day 6.5  AHI: 3.4   Recorded compliance dates 10/8/23-11/6/23  Machine/Mfg:   [] ResMed    [x] Respironics/Dreamstation   Interface:   [] Nasal    [] Nasal pillows   [x] FFM      Provider:      [] SR-HME     []Maria Esther     [x] Stormy    [] Wilda Henley    [] Toño               [] [employer]      [] Adaptive    [] [address] [facility] [address]:      [] Other    Neck Size: 18.5  Mallampati 4  ESS:  1  SAQLI: 97    Here is a scan of the most recent download:        Presentation:   Carlos Joshua presents for sleep medicine follow up for obstructive sleep apnea  Since the last visit, Carlos Joshua is doing well with PAP. He is sleeping well and feels rested. Mask fits well. AHi improved with pressure changed. He is no longer snoring. Equipment issues: The pressure is  acceptable, the mask is acceptable     Sleep issues:  Do you feel better? Yes  More rested? Yes   Better concentration? yes    Progress History:   Since last visit any new medical issues? No  New ER or hospital visits? No  Any new or changes in medicines? No  Any new sleep medicines? No    Review of Systems -   Review of Systems   Constitutional:  Negative for activity change, appetite change, chills and fever. HENT:  Negative for congestion and postnasal drip. Eyes: Negative. Respiratory:  Negative for cough, chest tightness, shortness of breath, wheezing and stridor. Cardiovascular:  Negative for chest pain and leg swelling. Gastrointestinal:  Negative for diarrhea and nausea. Endocrine: Negative. Genitourinary: Negative. Musculoskeletal: Negative.   Negative for

## 2023-11-13 ENCOUNTER — OFFICE VISIT (OUTPATIENT)
Dept: PULMONOLOGY | Age: 38
End: 2023-11-13
Payer: COMMERCIAL

## 2023-11-13 VITALS
BODY MASS INDEX: 44.1 KG/M2 | WEIGHT: 315 LBS | SYSTOLIC BLOOD PRESSURE: 126 MMHG | TEMPERATURE: 97.8 F | HEART RATE: 86 BPM | OXYGEN SATURATION: 94 % | DIASTOLIC BLOOD PRESSURE: 80 MMHG | HEIGHT: 71 IN

## 2023-11-13 DIAGNOSIS — G47.33 OSA ON CPAP: Primary | ICD-10-CM

## 2023-11-13 DIAGNOSIS — E66.01 MORBID OBESITY WITH BMI OF 45.0-49.9, ADULT (HCC): ICD-10-CM

## 2023-11-13 PROCEDURE — 99213 OFFICE O/P EST LOW 20 MIN: CPT | Performed by: PHYSICIAN ASSISTANT

## 2023-11-13 ASSESSMENT — ENCOUNTER SYMPTOMS
WHEEZING: 0
NAUSEA: 0
ALLERGIC/IMMUNOLOGIC NEGATIVE: 1
COUGH: 0
CHEST TIGHTNESS: 0
SHORTNESS OF BREATH: 0
STRIDOR: 0
BACK PAIN: 0
EYES NEGATIVE: 1
DIARRHEA: 0

## 2024-02-27 ENCOUNTER — HOSPITAL ENCOUNTER (OUTPATIENT)
Age: 39
Discharge: HOME | End: 2024-02-27
Payer: COMMERCIAL

## 2024-02-27 ENCOUNTER — HOSPITAL ENCOUNTER (OUTPATIENT)
Dept: GENERAL RADIOLOGY | Age: 39
Discharge: HOME | End: 2024-02-27
Payer: COMMERCIAL

## 2024-02-27 DIAGNOSIS — Z20.1 TUBERCULOSIS CONTACT: ICD-10-CM

## 2024-02-27 PROCEDURE — 71045 X-RAY EXAM CHEST 1 VIEW: CPT

## 2024-11-14 ENCOUNTER — OFFICE VISIT (OUTPATIENT)
Dept: PULMONOLOGY | Age: 39
End: 2024-11-14
Payer: COMMERCIAL

## 2024-11-14 VITALS
OXYGEN SATURATION: 96 % | DIASTOLIC BLOOD PRESSURE: 80 MMHG | BODY MASS INDEX: 44.1 KG/M2 | HEART RATE: 68 BPM | TEMPERATURE: 97.7 F | SYSTOLIC BLOOD PRESSURE: 128 MMHG | HEIGHT: 71 IN | WEIGHT: 315 LBS

## 2024-11-14 DIAGNOSIS — G47.33 OSA ON CPAP: Primary | ICD-10-CM

## 2024-11-14 DIAGNOSIS — E66.01 MORBID OBESITY WITH BMI OF 45.0-49.9, ADULT: ICD-10-CM

## 2024-11-14 PROCEDURE — 99214 OFFICE O/P EST MOD 30 MIN: CPT

## 2024-11-14 ASSESSMENT — ENCOUNTER SYMPTOMS
SORE THROAT: 0
WHEEZING: 0
COUGH: 0
RHINORRHEA: 0
SHORTNESS OF BREATH: 0

## 2024-11-14 NOTE — PROGRESS NOTES
[place] [facility] Pul[facility], Critical Care and Sleep Medicine      Brian A Humes         [id number]  11/14/2024   Chief Complaint   Patient presents with    Follow-up     1 year NIXON follow up         Patient of Dr. Sanches     Assessment/Plan   There are no diagnoses linked to this encounter.       -Yearly supply order placed? Yes   -Download was personally reviewed and discussed with patient at length.  -The symptoms of NIXON have improved. The patient is benefiting from therapy and should continue use of PAP device.  -Patient's symptoms and AHI are controlled with current settings of 12-20 cmH2O. Continue  current pressure settings.  -Advised to continue current positive airway pressure therapy with above described pressure.   -Advised to keep good compliance with current recommended pressure to get optimal results and clinical improvement  -Recommend 7-9 hours of sleep with PAP  -Instructed to call [employer] company regarding supplies if needed.   -Patient to call my office for earlier appointment if needed for worsening of sleep symptoms.   -Patient is not to drive if feeling sleepy  -Counseled patient on weight loss    Educated about my impression and plan. Patient verbalizes understanding.      No follow-ups on file. with download.      Subjective   Presentation:   Herman presents for sleep medicine follow up for obstructive sleep apnea.  Since the last visit, Herman has been doing very well on PAP therapy and reports good benefit from compliant use of PAP machine. No complaints of problems with machine, mask, or pressures at this time.       Sleep issues:  Do you feel better? Yes  More rested?Yes   Better concentration? yes  Difficulty falling sleep?  No  Difficulty staying asleep?  No  Snoring?  No    Progress History:   Since last visit any new medical issues? No  Any new or changes in medicines? No  Any new sleep medicines? No    PAP Download:   Original or initial AHI: 83.8     Date of initial study: 3/25/18

## 2024-11-15 NOTE — PROGRESS NOTES
Up to the bathroom. Voided. Tolerated well. Back to room getting dressed with wife's assistance. Linear

## 2025-06-26 ENCOUNTER — TELEPHONE (OUTPATIENT)
Dept: PULMONOLOGY | Age: 40
End: 2025-06-26

## 2025-06-26 DIAGNOSIS — G47.33 OSA ON CPAP: Primary | ICD-10-CM

## 2025-06-26 NOTE — TELEPHONE ENCOUNTER
His cpap machine broke. He is wanting an order for a new cpap. He is changing DME company and would like it to go to [facility]. Thank you

## 2025-06-26 NOTE — TELEPHONE ENCOUNTER
Patient called back and he voiced his understanding and made a follow up appt for Joslyn since I was unable to get in with jl. Pt will go with [facility] instead of [facility] for his replacement machine.

## 2025-06-26 NOTE — TELEPHONE ENCOUNTER
Order for new machine at same pressures placed.  Please fax to [facility] have patient follow-up in 3 months.  Thanks

## 2025-08-26 ENCOUNTER — OFFICE VISIT (OUTPATIENT)
Age: 40
End: 2025-08-26
Payer: COMMERCIAL

## 2025-08-26 VITALS
SYSTOLIC BLOOD PRESSURE: 120 MMHG | OXYGEN SATURATION: 94 % | HEIGHT: 71 IN | HEART RATE: 76 BPM | DIASTOLIC BLOOD PRESSURE: 98 MMHG | BODY MASS INDEX: 42.85 KG/M2 | WEIGHT: 306.1 LBS | TEMPERATURE: 98.3 F

## 2025-08-26 DIAGNOSIS — G47.33 OSA (OBSTRUCTIVE SLEEP APNEA): Primary | ICD-10-CM

## 2025-08-26 PROCEDURE — 99214 OFFICE O/P EST MOD 30 MIN: CPT

## (undated) DEVICE — ADHESIVE SKIN CLOSURE 0.7CC TOP MICROBIAL APPL DERMBND ADV

## (undated) DEVICE — APPLICATOR PREP 26ML 0.7% IOD POVACRYLEX 74% ISO ALC ST

## (undated) DEVICE — SUTURE PROL SZ 0 L30IN NONABSORBABLE BLU L36MM CT-1 1/2 CIR 8424H

## (undated) DEVICE — BREAST HERNIA: Brand: MEDLINE INDUSTRIES, INC.

## (undated) DEVICE — PENCIL SMK EVAC 15FT BLADE ELECTRD ROCKER F/TELSCP

## (undated) DEVICE — SUTURE VCRL + SZ 4-0 L27IN ABSRB WHT FS-2 3/8 CIR REV CUT VCP422H

## (undated) DEVICE — GLOVE ORANGE PI 7 1/2   MSG9075

## (undated) DEVICE — SUTURE VCRL + SZ 2-0 L27IN ABSRB CLR CT-1 1/2 CIR TAPERCUT VCP259H